# Patient Record
Sex: MALE | NOT HISPANIC OR LATINO | Employment: FULL TIME | URBAN - METROPOLITAN AREA
[De-identification: names, ages, dates, MRNs, and addresses within clinical notes are randomized per-mention and may not be internally consistent; named-entity substitution may affect disease eponyms.]

---

## 2017-09-22 ENCOUNTER — THERAPY VISIT (OUTPATIENT)
Dept: PHYSICAL THERAPY | Facility: CLINIC | Age: 37
End: 2017-09-22
Payer: COMMERCIAL

## 2017-09-22 DIAGNOSIS — M25.561 RIGHT KNEE PAIN: Primary | ICD-10-CM

## 2017-09-22 DIAGNOSIS — M25.551 HIP PAIN, RIGHT: ICD-10-CM

## 2017-09-22 PROCEDURE — 97140 MANUAL THERAPY 1/> REGIONS: CPT | Mod: GP | Performed by: PHYSICAL THERAPIST

## 2017-09-22 PROCEDURE — 97161 PT EVAL LOW COMPLEX 20 MIN: CPT | Mod: GP | Performed by: PHYSICAL THERAPIST

## 2017-09-22 PROCEDURE — 97110 THERAPEUTIC EXERCISES: CPT | Mod: GP | Performed by: PHYSICAL THERAPIST

## 2017-09-22 ASSESSMENT — ACTIVITIES OF DAILY LIVING (ADL)
GO UP STAIRS: ACTIVITY IS NOT DIFFICULT
PAIN: THE SYMPTOM AFFECTS MY ACTIVITY SLIGHTLY
STAND: ACTIVITY IS NOT DIFFICULT
STIFFNESS: I DO NOT HAVE THE SYMPTOM
SQUAT: ACTIVITY IS SOMEWHAT DIFFICULT
WEAKNESS: I DO NOT HAVE THE SYMPTOM
GO DOWN STAIRS: ACTIVITY IS MINIMALLY DIFFICULT
HOW_WOULD_YOU_RATE_THE_OVERALL_FUNCTION_OF_YOUR_KNEE_DURING_YOUR_USUAL_DAILY_ACTIVITIES?: NOT ANSWERED
RAW_SCORE: 59.23
WALK: ACTIVITY IS NOT DIFFICULT
KNEE_ACTIVITY_OF_DAILY_LIVING_SCORE: 84.61
SIT WITH YOUR KNEE BENT: NOT ANSWERED
GIVING WAY, BUCKLING OR SHIFTING OF KNEE: I DO NOT HAVE THE SYMPTOM
RISE FROM A CHAIR: ACTIVITY IS MINIMALLY DIFFICULT
AS_A_RESULT_OF_YOUR_KNEE_INJURY,_HOW_WOULD_YOU_RATE_YOUR_CURRENT_LEVEL_OF_DAILY_ACTIVITY?: NOT ANSWERED
KNEEL ON THE FRONT OF YOUR KNEE: ACTIVITY IS SOMEWHAT DIFFICULT
KNEE_ACTIVITY_OF_DAILY_LIVING_SUM: 55
LIMPING: I HAVE THE SYMPTOM BUT IT DOES NOT AFFECT MY ACTIVITY
SWELLING: I DO NOT HAVE THE SYMPTOM

## 2017-09-22 NOTE — LETTER
[facility]TH[facility] PHYSICAL THERAPY  [address]  [place] [address]  [phone]    2017    Re: Garfield Kuhn   :   1980  MRN:  [id number]   REFERRING PHYSICIAN:   Wendi Cooley MD    [facility] PHYSICAL [facility]  Date of Initial Evaluation:  2017  Visits: 1  Rxs Used: 1  Reason for Referral:     Right knee pain  Hip pain, right  EVALUATION SUMMARY  Physical Therapy Initial Evaluation  2017   Precautions/Restrictions/MD instructions: PT eval and treat. R patellar tendonitis, patellar and medial femoral condyle chondromalacia.    Subjective:   Date of Onset: 4 weeks ago  C/C: R anterior knee pain along patellar tendon and R hip anterior pain.   Quality of pain is sharp. Pains are described as intermittent in nature. Pain is worse: with activity. Pain is rated 4/10.   History of symptoms: Pains began gradually as the result of running. Since onset, symptoms are same.  Worsened by: Running,stairs, kneeling. Also noticed he has been flaring R knee out when cycling. Some pain in knee at top of stroke.   Alleviated by: Rest.    General health as reported by patient: good  Pertinent medical/surgical history: Elbow surgery 2x. Imaging: x-ray. Current occupational status: Management. Patient's goals are: decrease pain, running/cycling without pain. Return to MD:  PRN.   Current exercise regime: 100 miles biking per week. Runs 3-4x wk about 4 miles  Therapist Impression:   Garfield Kuhn is a 37 year old male with 4 week history of right knee and hip pain. He presents with signs and symptoms consistent with patellar tendonitis, patellar and medial femoral condyle chondromalacia. These impairments limit his ability to run without pain. Skilled PT services are necessary in order to reduce impairments and improve independent function.  Objective:  Re: Garfield Kuhn   :   1980   KNEE EXAMINATION  Dynamic  Movement Screen:  DL Squat: Good technique/no significant findings  SL Squat: Anterior knee translation, Knee valgus, Hip internal rotation and Improper use of glutes/hips  Gait: No significant findings  Step: Not assessed    Knee PROM L  Pain? R Pain?   Hyperextension 5 - 8 -   Flexion 124 - 126 -     Stability Tests Instability    L Pain? R Pain?   Varus 0 - - - -   Varus 30 - - - -   Valgus 0 - - - -   Valgus 30 - - - -   Anterior Drawer - - - -   Posterior Drawer - - - -   Lachman - - - -     Resisted Tests Strength (MMT)    L Pain? R Pain?   Knee Ext       Knee Flx       Hip ABD 5 - 5 -   Hip EXT 5 - 5 -   Other:  Joint Line Swelling: None  Passive Patellar Mobility: Normal  Meniscal: n/a  Assessment/Plan:    The patient is a 37 year old male with chief complaint of R hip and knee pain.    The patient has the following significant findings with corresponding treatment plan.  Diagnosis 1:  R knee pain, patellar tendonitis, PF chondromalacia    Pain -  hot/cold therapy, US, electric stimulation, manual therapy, splint/taping/bracing/orthotics and self management  Decreased ROM/flexibility - manual therapy, therapeutic exercise and home program  Decreased joint mobility - manual therapy and therapeutic exercise  Decreased strength - therapeutic exercise, therapeutic activities and home program  Impaired muscle performance - neuro re-education  Decreased function - therapeutic activities  Diagnosis 2:  R hip impingement     Pain -  hot/cold therapy, US, electric stimulation, manual therapy, splint/taping/bracing/orthotics and self management  Decreased ROM/flexibility - manual therapy, therapeutic exercise and home program  Decreased joint mobility - manual therapy and therapeutic exercise  Decreased strength - therapeutic exercise, therapeutic activities and home program  Impaired muscle performance - neuro re-education  Decreased function - therapeutic activities  Impaired posture - neuro re-education  Therapy  Evaluation Codes:   1) History comprised of:   Personal factors that impact the plan of care:      None.    Comorbidity factors that impact the plan of care are:      None.     Medications impacting care: None.  2) Examination of Body Systems comprised of:   Body structures and functions that impact the plan of care:      Hip and Knee.   Activity limitations that impact the plan of care are:      Bending, Sitting and Squatting/kneeling.   Clinical presentation characteristics are:    Stable/Uncomplicated.  3) Presentation comprised of:   Presentation scored as Low complexity with uncomplicated characteristics..  4) Decision-Making    Low complexity using standardized patient assessment instrument and/or measureable assessment of functional outcome.  Cumulative Therapy Evaluation is: Low complexity.  Previous and current functional limitations:  (See Goal Flow Sheet for this information)    Short term and Long term goals: (See Goal Flow Sheet for this information)   Communication ability:  Patient appears to be able to clearly communicate and understand verbal and written communication and follow directions correctly.  Treatment Explanation - The following has been discussed with the patient: RX ordered/plan of care, anticipated outcomes, and possible risks and side effects.  This patient would benefit from PT intervention to resume normal activities.   Rehab potential is good.  Frequency:  2 X a month, once daily  Duration:  for 3 months  Discharge Plan: Achieve all LTGs, be independent in home treatment program, and reach maximal therapeutic benefit.                           Thank you for your referral.    INQUIRIES  Therapist: Elliot Thakur DPT   [facility] PHYSICAL [facility]ERAP[facility]  [address] [place], [place] [address] [address]  Phone: [phone]  Fax: [phone]

## 2017-09-22 NOTE — PROGRESS NOTES
Physical Therapy Initial Evaluation  September 22, 2017     Precautions/Restrictions/MD instructions: PT eval and treat. R patellar tendonitis, patellar and medial femoral condyle chondromalacia.    Subjective:   Date of Onset: 4 weeks ago  C/C: R anterior knee pain along patellar tendon and R hip anterior pain.   Quality of pain is sharp. Pains are described as intermittent in nature. Pain is worse: with activity. Pain is rated 4/10.   History of symptoms: Pains began gradually as the result of running. Since onset, symptoms are same.  Worsened by: Running,stairs, kneeling. Also noticed he has been flaring R knee out when cycling. Some pain in knee at top of stroke.   Alleviated by: Rest.    General health as reported by patient: good  Pertinent medical/surgical history: Elbow surgery 2x. Imaging: x-ray. Current occupational status: Management. Patient's goals are: decrease pain, running/cycling without pain. Return to MD:  PRN.   Current exercise regime: 100 miles biking per week. Runs 3-4x wk about 4 miles    Therapist Impression:   Garfield Kuhn is a 37 year old male with 4 week history of right knee and hip pain. He presents with signs and symptoms consistent with patellar tendonitis, patellar and medial femoral condyle chondromalacia. These impairments limit his ability to run without pain. Skilled PT services are necessary in order to reduce impairments and improve independent function.    Objective:   KNEE EXAMINATION    Dynamic Movement Screen:  DL Squat: Good technique/no significant findings  SL Squat: Anterior knee translation, Knee valgus, Hip internal rotation and Improper use of glutes/hips  Gait: No significant findings  Step: Not assessed    Knee PROM L  Pain? R Pain?   Hyperextension 5 - 8 -   Flexion 124 - 126 -     Stability Tests Instability    L Pain? R Pain?   Varus 0 - - - -   Varus 30 - - - -   Valgus 0 - - - -   Valgus 30 - - - -   Anterior Drawer - - - -   Posterior Drawer - - - -    Lachman - - - -     Resisted Tests Strength (MMT)    L Pain? R Pain?   Knee Ext       Knee Flx       Hip ABD 5 - 5 -   Hip EXT 5 - 5 -     Other:  Joint Line Swelling: None  Passive Patellar Mobility: Normal  Meniscal: n/a    Assessment/Plan:    The patient is a 37 year old male with chief complaint of R hip and knee pain.    The patient has the following significant findings with corresponding treatment plan.  Diagnosis 1:  R knee pain, patellar tendonitis, PF chondromalacia    Pain -  hot/cold therapy, US, electric stimulation, manual therapy, splint/taping/bracing/orthotics and self management  Decreased ROM/flexibility - manual therapy, therapeutic exercise and home program  Decreased joint mobility - manual therapy and therapeutic exercise  Decreased strength - therapeutic exercise, therapeutic activities and home program  Impaired muscle performance - neuro re-education  Decreased function - therapeutic activities    Diagnosis 2:  R hip impingement     Pain -  hot/cold therapy, US, electric stimulation, manual therapy, splint/taping/bracing/orthotics and self management  Decreased ROM/flexibility - manual therapy, therapeutic exercise and home program  Decreased joint mobility - manual therapy and therapeutic exercise  Decreased strength - therapeutic exercise, therapeutic activities and home program  Impaired muscle performance - neuro re-education  Decreased function - therapeutic activities  Impaired posture - neuro re-education      Therapy Evaluation Codes:   1) History comprised of:   Personal factors that impact the plan of care:      None.    Comorbidity factors that impact the plan of care are:      None.     Medications impacting care: None.  2) Examination of Body Systems comprised of:   Body structures and functions that impact the plan of care:      Hip and Knee.   Activity limitations that impact the plan of care are:      Bending, Sitting and Squatting/kneeling.   Clinical presentation  characteristics are:    Stable/Uncomplicated.  3) Presentation comprised of:   Presentation scored as Low complexity with uncomplicated characteristics..  4) Decision-Making    Low complexity using standardized patient assessment instrument and/or measureable assessment of functional outcome.  Cumulative Therapy Evaluation is: Low complexity.    Previous and current functional limitations:  (See Goal Flow Sheet for this information)    Short term and Long term goals: (See Goal Flow Sheet for this information)     Communication ability:  Patient appears to be able to clearly communicate and understand verbal and written communication and follow directions correctly.  Treatment Explanation - The following has been discussed with the patient: RX ordered/plan of care, anticipated outcomes, and possible risks and side effects.  This patient would benefit from PT intervention to resume normal activities.   Rehab potential is good.    Frequency:  2 X a month, once daily  Duration:  for 3 months  Discharge Plan: Achieve all LTGs, be independent in home treatment program, and reach maximal therapeutic benefit.    Please refer to the daily flowsheet for treatment today, total treatment time and time spent performing 1:1 timed codes.

## 2017-09-22 NOTE — PROGRESS NOTES
Subjective:    Patient is a 37 year old male presenting with rehab left ankle/foot hpi.                                      Pertinent medical history includes:  None.  Medical allergies: no.  Other surgeries include:  None reported.  Current medications:  Anti-depressants.  Current occupation is Management .  Patient is working in normal job without restrictions.      Barriers include:  None as reported by patient.    Red flags:  None as reported by patient.           Knee Activity of Daily Living Score: 84.61            Objective:    System    Physical Exam    General     ROS    Assessment/Plan:

## 2017-10-03 ENCOUNTER — THERAPY VISIT (OUTPATIENT)
Dept: PHYSICAL THERAPY | Facility: CLINIC | Age: 37
End: 2017-10-03
Payer: COMMERCIAL

## 2017-10-03 DIAGNOSIS — M25.551 HIP PAIN, RIGHT: ICD-10-CM

## 2017-10-03 DIAGNOSIS — M25.561 RIGHT KNEE PAIN: ICD-10-CM

## 2017-10-03 PROCEDURE — 97140 MANUAL THERAPY 1/> REGIONS: CPT | Mod: GP | Performed by: PHYSICAL THERAPIST

## 2017-10-03 PROCEDURE — 97112 NEUROMUSCULAR REEDUCATION: CPT | Mod: GP | Performed by: PHYSICAL THERAPIST

## 2017-10-03 PROCEDURE — 97110 THERAPEUTIC EXERCISES: CPT | Mod: GP | Performed by: PHYSICAL THERAPIST

## 2017-10-17 ENCOUNTER — THERAPY VISIT (OUTPATIENT)
Dept: PHYSICAL THERAPY | Facility: CLINIC | Age: 37
End: 2017-10-17
Payer: COMMERCIAL

## 2017-10-17 DIAGNOSIS — M25.551 HIP PAIN, RIGHT: ICD-10-CM

## 2017-10-17 DIAGNOSIS — M25.561 RIGHT KNEE PAIN: ICD-10-CM

## 2017-10-17 PROCEDURE — 97140 MANUAL THERAPY 1/> REGIONS: CPT | Mod: GP | Performed by: PHYSICAL THERAPIST

## 2017-10-17 PROCEDURE — 97112 NEUROMUSCULAR REEDUCATION: CPT | Mod: GP | Performed by: PHYSICAL THERAPIST

## 2017-10-31 ENCOUNTER — THERAPY VISIT (OUTPATIENT)
Dept: PHYSICAL THERAPY | Facility: CLINIC | Age: 37
End: 2017-10-31
Payer: COMMERCIAL

## 2017-10-31 DIAGNOSIS — M25.561 RIGHT KNEE PAIN: ICD-10-CM

## 2017-10-31 DIAGNOSIS — M25.551 HIP PAIN, RIGHT: ICD-10-CM

## 2017-10-31 PROCEDURE — 97112 NEUROMUSCULAR REEDUCATION: CPT | Mod: GP | Performed by: PHYSICAL THERAPIST

## 2017-10-31 PROCEDURE — 97110 THERAPEUTIC EXERCISES: CPT | Mod: GP | Performed by: PHYSICAL THERAPIST

## 2017-10-31 NOTE — PROGRESS NOTES
Running Analysis:  Rear View:  Increased supination throughout gait cycle on R compared to L.   Mildly decreased trunk rotation on R and holds arm slightly closer to body.   Increased vertical displacement.  R side trendelenburg.  Increased scissoring on R vs. L.    Side View:  Near full knee extension and increased forward foot strike on R compared to L.   Slightly lacking hip extension bilaterally.     Front View:  Increased supination throughout gait cycle on R compared to L.   Increased scissoring on R vs. L.  No increase in IR of femur during landing/loading on R (WNL).    Recommendations:  Nelly manipulation to 170-172 steps per minute  Hip hiking to correct R glute med functional deficit.   Single leg hop, stick, and hold for functional R side kinetic chain motor control.

## 2017-11-28 ENCOUNTER — THERAPY VISIT (OUTPATIENT)
Dept: PHYSICAL THERAPY | Facility: CLINIC | Age: 37
End: 2017-11-28
Payer: COMMERCIAL

## 2017-11-28 DIAGNOSIS — M25.561 RIGHT KNEE PAIN: ICD-10-CM

## 2017-11-28 DIAGNOSIS — M25.551 HIP PAIN, RIGHT: ICD-10-CM

## 2017-11-28 PROCEDURE — 97110 THERAPEUTIC EXERCISES: CPT | Mod: GP | Performed by: PHYSICAL THERAPIST

## 2017-11-28 PROCEDURE — 97112 NEUROMUSCULAR REEDUCATION: CPT | Mod: GP | Performed by: PHYSICAL THERAPIST

## 2018-01-02 ENCOUNTER — THERAPY VISIT (OUTPATIENT)
Dept: PHYSICAL THERAPY | Facility: CLINIC | Age: 38
End: 2018-01-02
Payer: COMMERCIAL

## 2018-01-02 DIAGNOSIS — M25.551 HIP PAIN, RIGHT: ICD-10-CM

## 2018-01-02 DIAGNOSIS — M25.561 RIGHT KNEE PAIN: ICD-10-CM

## 2018-01-02 PROCEDURE — 97530 THERAPEUTIC ACTIVITIES: CPT | Mod: GP | Performed by: PHYSICAL THERAPIST

## 2018-01-02 PROCEDURE — 97110 THERAPEUTIC EXERCISES: CPT | Mod: GP | Performed by: PHYSICAL THERAPIST

## 2018-04-05 PROBLEM — M25.551 HIP PAIN, RIGHT: Status: RESOLVED | Noted: 2017-09-22 | Resolved: 2018-04-05

## 2018-04-05 PROBLEM — M25.561 RIGHT KNEE PAIN: Status: RESOLVED | Noted: 2017-09-22 | Resolved: 2018-04-05

## 2018-04-05 NOTE — PROGRESS NOTES
Discharge Note    Progress reporting period is from initial eval to Jan 2, 2018.     Garfield failed to return for next follow up visit and current status is unknown.  Please see information below for last relevant information on current status.  Patient seen for Rxs Used: 6 visits.Decreased ROM/flexibility - HEP  Decreased function - HEP  Decreased strength - HEPAssessment of Progress: current status is unknown.  Last current status: Assessment of progress: Pt has experienced exacerbation of symptoms (hip).  Self Management Plans:  HEP    I have re-evaluated this patient and find that the nature, scope, duration and intensity of the therapy is appropriate for the medical condition of the patient.  Garfield continues to require the following intervention to meet STG and LTG's:  HEP.    Recommendations:  Discharge with current home program.  Patient to follow up with MD as needed. Episode to be closed at this time and patient formally discharged from therapy.    Elliot Thakur, PT, DPT, OCS      Please refer to the daily flowsheet for treatment today, total treatment time and time spent performing 1:1 timed codes.

## 2018-09-25 ENCOUNTER — OFFICE VISIT (OUTPATIENT)
Dept: OTHER | Facility: OUTPATIENT CENTER | Age: 38
End: 2018-09-25
Payer: COMMERCIAL

## 2018-09-25 DIAGNOSIS — F32.5 MAJOR DEPRESSION IN COMPLETE REMISSION (H): Primary | ICD-10-CM

## 2018-09-25 DIAGNOSIS — F43.21 ADJUSTMENT DISORDER WITH DEPRESSED MOOD: ICD-10-CM

## 2018-09-25 ASSESSMENT — ANXIETY QUESTIONNAIRES
2. NOT BEING ABLE TO STOP OR CONTROL WORRYING: NOT AT ALL
6. BECOMING EASILY ANNOYED OR IRRITABLE: NOT AT ALL
1. FEELING NERVOUS, ANXIOUS, OR ON EDGE: NOT AT ALL
5. BEING SO RESTLESS THAT IT IS HARD TO SIT STILL: NOT AT ALL
7. FEELING AFRAID AS IF SOMETHING AWFUL MIGHT HAPPEN: NOT AT ALL
GAD7 TOTAL SCORE: 0
3. WORRYING TOO MUCH ABOUT DIFFERENT THINGS: NOT AT ALL

## 2018-09-25 ASSESSMENT — PATIENT HEALTH QUESTIONNAIRE - PHQ9: 5. POOR APPETITE OR OVEREATING: NOT AT ALL

## 2018-09-25 NOTE — MR AVS SNAPSHOT
After Visit Summary   2018    Garfield Kuhn    MRN: [id number]           Patient Information     Date Of Birth          1980        Visit Information        Provider Department      2018 8:30 AM Kin Panchal [facility]        Today's Diagnoses     Major depression in complete remission (H)    -  1    Adjustment disorder with depressed mood           Follow-ups after your visit        Who to contact     Please call your clinic at [phone] to:    Ask questions about your health    Make or cancel appointments    Discuss your medicines    Learn about your test results    Speak to your doctor            Additional Information About Your Visit        MyChart Information     [employer] is an electronic gateway that provides easy, online access to your medical records. With [employer], you can request a clinic appointment, read your test results, renew a prescription or communicate with your care team.     To sign up for [employer] visit the website at [email or web address]   You will be asked to enter the access code listed below, as well as some personal information. Please follow the directions to create your username and password.     Your access code is: [email or web address]  Expires: 2018  5:56 PM     Your access code will  in 90 days. If you need help or a new code, please contact your [facility] or call [phone] for assistance.        Care EveryWhere ID     This is your Care EveryWhere ID. This could be used by other organizations to access your [place] medical records  [id number]         Blood Pressure from Last 3 Encounters:   05 110/80   10/11/05 122/70    Weight from Last 3 Encounters:   10/11/05 118.9 kg (262 lb 3.2 oz)              We Performed the Following     Diagnostic Assessment (complete) [34321]        Primary Care Provider Office Phone # Fax #    Waqar Stokes [phone]        NO INFO FOUND  [facility] [address]        Equal Access to Services     TRINOSANA BRENDA : Hadii aad chucho cris Thomas, wadeonda luqgee, qabobta kaerick racielwildstar, waxsalud dante aleenaraul blankenship shaniquebasia pastrana. So [facility] [phone].    ATENCIÓN: Si habla español, tiene a mensah disposición servicios gratuitos de asistencia lingüística. Llame al [phone].    We comply with applicable federal civil rights laws and [place] laws. We do not discriminate on the basis of race, color, national origin, age, disability, sex, sexual orientation, or gender identity.            Thank you!     Thank you for choosing [facility]  for your care. Our goal is always to provide you with excellent care. Hearing back from our patients is one way we can continue to improve our services. Please take a few minutes to complete the written survey that you may receive in the mail after your visit with us. Thank you!             Your Updated Medication List - Protect others around you: Learn how to safely use, store and throw away your medicines at www.disposemymeds.org.          This list is accurate as of 9/25/18  5:56 PM.  Always use your most recent med list.                   Brand Name Dispense Instructions for use Diagnosis    EFFEXOR  MG 24 hr capsule   Generic drug:  venlafaxine      1 CAPSULE DAILY WITH FOOD        FLONASE 50 MCG/DOSE nasal spray     1    2 SPRAYS IN EACH NOSTRIL QD (Once per day)    Depressive disorder, not elsewhere classified       LEXAPRO 20 MG tablet   Generic drug:  escitalopram      1 TABLET DAILY        LORazepam 0.5 MG tablet    ATIVAN     1 TABLET TWICE DAILY

## 2018-09-25 NOTE — PROGRESS NOTES
"[facility]  Program in Human Sexuality  Department of Family Medicine & Community Health  [facility]   [address]               [place] [address]  Phone: [phone]  Fax: [phone]  [email or web address]    Relationship and Sex Therapy (REST) Diagnostic Assessment Interview      Date of Service: 9/25/18  Client Name: Garfield Kuhn  YOB: 1980  38 year old  MRN:  [id number]  Gender/Gender Identity: Male  Treating Provider: Kin Panchal, PhD.  Program: REST  Type of Session: Assessment  Present in Session: MH, client; NN, therapist.  Number of Minutes:  60      Ethnicity:      Any relevant cultural/Anglican issues? Client identifies as Jainism. He states that he attends services a \"handful\" of times a year.      Referral Source     Chief Complaint/Presenting Problem and Goals:  Relationship conflict.    Relationship and Sexual Therapy (REST) Program-Specific Information   1. Sexual behaviors/Functioning    Sexual Frequency (current, past history, ideal)   Client states that prior to the affair, he was having sex with his wife once a week. Client states while he was having the affair, he would have sex once every two weeks with his wife. Client states that he is not currently having sex with his wife. Client reports that he would typically initiate sex. Client states that ideally he would like to have sex with his wife once or twice a week.       Sexual Desire/Interest/Arousal  Client reports no issues with sexual desire/interest/arousal.       Erections, Orgasm, & Ejaculation   Client reports no issues with erections, orgasm or ejaculations     Pain During Sex  Client reports no pain during sex.     Sexual aversion/avoidance  Client reports no sexual behaviors he is averse to or avoidant of.       2. Relationship History   Client reports that he has been with his partner for 17 years;  for 12 years. Client " "reports that they met in grade school and high school. They began dating after college. Client states that they dated four to five years before they were engaged. Client states that they were engaged for a year to a year and a half before they got .      Client reports that he had an affair with a partner he dated in high school (Cesia) and in college. The relationship ended because of school; she was the one who ended it. Client states that the ending to their relationship was \"awkward\" and was one of the reasons she wanted to reach back out. Client states that when the affair was going, he would have contact with Cesia once a week. States they would see each other 3-4 times a week and typically have sex once a week.    Initially, client communicated that he wanted to be friends, but ended up having an emotional and physical relationship with her. Client reported that the affair lasted from August 2017 through June 2018. Client states he tried to end the relationship in January, 2018. In June of this year, her  was suspicious that Cesia was cheating. At the beginning of September, 2018, Cesia's  contacted his wife. Client reports he has had no contact with Cesia since the beginning of the month.     Client states that he was missing passion and attractiveness to his wife. Client states he was not looking for an affair; states that this is a  out.    Client states that since the affair, he still lives with his wife. He reports that they sleep in the same bed, say \"I love you\" to each other. Client states that what keeps him leaning into the relationship are his children, spending time with his wife, shared interests, and talking together.     First sexual experience with partner:  Client states that he first had sex at age 17.     Unpleasant or traumatic sexual experiences   Client states that he has never had a traumatic or unpleasant sexual experiences.     Masturbation   Client states that he " masturbates once a week. Client uses pornography to masturbate to. Client states that his masturbation and pornography have not caused conflict with his wife.       Same sex experiences and fantasies  Client denies issues with sexual orientation or gender identity.     3. Couple Relationship Assessment   1 =  Totally Dissatisfied     10 = Totally Satisfied                 Overall happiness/Satisfaction:   8   Personality of partner:  8   Communication/Conflict resolution:  6   Financial management:  9   Sexual relationship:  4   Children/parentin   Family and friends:  4   Household roles/responsibilities:  8   Other:       4. Compulsive Sexuality:   Client denies any issues with compulsive sexual behaviors.     Mental Health History:   Client went through a severe bout of depression last spring. He was hospitalized for a weekend and was hospitalized again, this time for four days. States he received ECT for his depression. Client states that he has experienced depression since . Client has been taking medication and seeing a therapist. Client states that he did not realize the extent that his depression has had on his marriage. He states his wife has walked on egg shells. Client states he has never attempted to take his life. Client states that work is a trigger for his depression. He states that his suicidal spirals are triggered by disappointments at his job.    Client is currently in DEBT program at [facility] in [place], [place]. Client states he has been participating in DEBT for a year. Client states he is unsure if he finds DEBT helpful but that his depression/anxiety feels more under control. Client states that his wife has attended some of his therapy appointments. The focus of therapy was on his depression and not couple specific.     Substance Use:  Client denies substance abuse issues. Client states that he will have 2-6 drinks a week.       Medical History:  Client states that he has had  "jaw and elbow surgeries in the past.     Client states that his wife has had her tubes tied.    Relationships/Social History    Family History: [where grew up, moves, parent divorce]    Client states he was born in [place], [place] and raised by Derik (74) and Liz (73). Client's father was a doctor and his mother was a nurse. Client states that he is close with his mother and is distant with his father. Client states that his relationship with his father has always been \"awkward.\" Client has two sisters, Colleen (41) and Nicolle (47). Client states that he gets along with his sisters. Client states that his mother is on antidepressants; client states that his family does not have a history of substance abuse/dependence. Client states that he told his family about the affair. He states that his family members are not happy with him and are upset. Client states that he felt compelled to tell them. He reports that he wanted to be honest with them and wanted his wife to have someone else to talk to about the affair. Client states that his wife is close to his family. Client states that his in laws do not know about the affair. He states that he does not have a relationship with them.He reports that he does not know why his in laws do not like them; feels ignored during family events.    Client has two children with his wife: Joselyn (4); Tarun (1).     Client denies any physical/sexual abuse.    Educational History   Client states that he has a BA in E[employer].     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 Client states that he likes his job currently; is unsure about his future career goals.     Legal Issues  Denies any legal issues.      _________________________________________________________________________     CONCLUSIONS    Strengths and Liabilities: social support; enjoys spending time with his partner; recent affair    Symptoms: Client is currently " struggling with depressive symptoms. These symptoms include low mood, excessive shame/guilt, and suicidal ideation. These symptoms have interfered with his relationship with his wife. Additionally, client is struggling with the recent disclosure that he had been having an extramarital affair. This has caused relationship stress with his wife and has increased his feelings of guilt/shame.      Mental Status:   Appearance:  Casually dressed  Behavior/relationship to examiner/demeanor:  Cooperative  Orientation: Oriented to person, place, time and situation  Speech Rate:  Normal  Speech Spontaneity:  Normal  Mood:  depressed, sad and remorseful  Affect:  Appropriate/mood-congruent  Thought Process (Associations):  Logical  Thought Content:  no evidence of suicidal or homicidal ideation and patient denies auditory and visual hallucinations  Abnormal Perception:  None  Attention/Concentration:  Fair  Language:  Intact  Insight:  Fair  Judgment:  Poor      DSM-5 Diagnosis:  296.26 (F32.5)           Major Depressive Disorder, In full remission, Single Episode  309.0 (F43.21)   Adjustment Disorder, With depressed mood  V61.10 (Z63.0)           Relationship Distress with Spouse or Intimate Partner    Conclusions/Recommendations/Initial Treatment Goals:     The client is a 38 year old American person assigned male at birth who identifies as a male. Based on the client's current report of symptoms, client continues to meet criteria for MDD, In full remission, single episode and Adjustment Disorder with depressed mood. The client's mental health concerns affect client's ability to function in his relationship as he reports relationship distress and depression associated with his relationship problems. The client reports no significant changes in his alcohol use frequency and duration. Client denies safety concerns. Based on the client's reported symptoms and impact on functioning, the plan for the patient is:  1. Start  supportive family therapy with a provider specialized in couples relationships. Therapy should focus on helping client process his affair and reconnect with his partner.  2. Continue care with a psychiatrist for medication management.   3. Continue care with client's therapist; continue care with client's DEBT group.  4. Continue to assess the impact of client's family and relational patterns on their current well-being.       Kin Panchal, ., LMFT

## 2018-09-25 NOTE — PROGRESS NOTES
I did not personally see the patient. I reviewed and agree with the assessment and plan of this note.     Charlotte Salas, PhD, LMFT

## 2018-09-27 ASSESSMENT — ANXIETY QUESTIONNAIRES: GAD7 TOTAL SCORE: 0

## 2018-09-27 ASSESSMENT — PATIENT HEALTH QUESTIONNAIRE - PHQ9: SUM OF ALL RESPONSES TO PHQ QUESTIONS 1-9: 3

## 2018-11-05 ENCOUNTER — OFFICE VISIT (OUTPATIENT)
Dept: OTHER | Facility: OUTPATIENT CENTER | Age: 38
End: 2018-11-05
Payer: COMMERCIAL

## 2018-11-05 DIAGNOSIS — F32.5 MAJOR DEPRESSION IN COMPLETE REMISSION (H): ICD-10-CM

## 2018-11-05 DIAGNOSIS — F43.21 ADJUSTMENT DISORDER WITH DEPRESSED MOOD: Primary | ICD-10-CM

## 2018-11-05 NOTE — MR AVS SNAPSHOT
After Visit Summary   11/5/2018    Garfield Kuhn    MRN: [id number]           Patient Information     Date Of Birth          1980        Visit Information        Provider Department      11/5/2018 8:30 AM Kin Panchal FT [facility] Sexual Health        Today's Diagnoses     Adjustment disorder with depressed mood    -  1    Major depression in complete remission (H)           Follow-ups after your visit        Your next 10 appointments already scheduled     Nov 19, 2018  9:30 AM CST   Family Therapy with Kin Panchal FT   [facility] ([facility])    [address]  [address]  [facility] [address]   [phone]            Dec 03, 2018  8:30 AM CST   Family Therapy with Kin Panchal FT   [facility] ([facility])    [address]  [address]  [facility] [address]   [phone]            Dec 17, 2018  8:30 AM CST   Family Therapy with Kin Panchla FT   [facility] ([facility])    [address]  [address]  [facility] [address]   [phone]              Who to contact     Please call your clinic at [phone] to:    Ask questions about your health    Make or cancel appointments    Discuss your medicines    Learn about your test results    Speak to your doctor            Additional Information About Your Visit        MyChart Information     [employer] is an electronic gateway that provides easy, online access to your medical records. With [employer], you can request a clinic appointment, read your test results, renew a prescription or communicate with your care team.     To sign up for [employer] visit the website at [email or web address]   You will be asked to enter the access code listed below, as well as some personal information. Please follow the directions to create your username and password.     Your access code is: [email or web address]  Expires:  2018  4:56 PM     Your access code will  in 90 days. If you need help or a new code, please contact your [facility] or call [phone] for assistance.        Care EveryWhere ID     This is your Care EveryWhere ID. This could be used by other organizations to access your [facility] medical records  [id number]         Blood Pressure from Last 3 Encounters:   05 110/80   10/11/05 122/70    Weight from Last 3 Encounters:   10/11/05 118.9 kg (262 lb 3.2 oz)              We Performed the Following     Mental Health Tx Plan Scan (HIM Scan)     Psychotherapy Family/Couple with Patient [[id number]]        Primary Care Provider Office Phone # Fax #    Waqar Stokes [phone]       NO INFO FOUND  [facility] [address]        Equal Access to Services     AYDEN GUTIERREZ : Hadii thu ku hadasho Soomaali, waaxda luqadaha, qaybta kaalmada adeegyada, brigette howell hayyarely orozco . So [facility] [phone].    ATENCIÓN: Si habla español, tiene a mensah disposición servicios gratuitos de asistencia lingüística. Llame al [phone].    We comply with applicable federal civil rights laws and [place] laws. We do not discriminate on the basis of race, color, national origin, age, disability, sex, sexual orientation, or gender identity.            Thank you!     Thank you for choosing [facility]  for your care. Our goal is always to provide you with excellent care. Hearing back from our patients is one way we can continue to improve our services. Please take a few minutes to complete the written survey that you may receive in the mail after your visit with us. Thank you!             Your Updated Medication List - Protect others around you: Learn how to safely use, store and throw away your medicines at www.disposemymeds.org.          This list is accurate as of 18 11:59 PM.  Always use your most recent med list.                   Brand  Name Dispense Instructions for use Diagnosis    EFFEXOR  MG 24 hr capsule   Generic drug:  venlafaxine      1 CAPSULE DAILY WITH FOOD        FLONASE 50 MCG/DOSE nasal spray     1    2 SPRAYS IN EACH NOSTRIL QD (Once per day)    Depressive disorder, not elsewhere classified       LEXAPRO 20 MG tablet   Generic drug:  escitalopram      1 TABLET DAILY        LORazepam 0.5 MG tablet    ATIVAN     1 TABLET TWICE DAILY

## 2018-11-05 NOTE — PROGRESS NOTES
"[facility] -  Case Progress Note    Date of Service: 18   Name: Garfield Kuhn  : 1980  Medical Record Number: [id number]  Treating Provider: Kin Panchal, PhD., [facility]  Type of Session: Couple  Present in Session: MH, client; LH, client's spouse; NN, therapist.  Number of Minutes:  60    Current Symptoms/Status:  Client is currently struggling with depressive symptoms. These symptoms include low mood, excessive shame/guilt, and suicidal ideation. These symptoms have interfered with his relationship with his wife. Additionally, client is struggling with the recent disclosure that he had been having an extramarital affair. This has caused relationship stress with his wife and has increased his feelings of guilt/shame.    Progress Toward Treatment Goals:   Reviewed and signed treatment plans. Client stated that since his last sessions, he feels that the relationship is going \"ok.\" Client's wife stated that they have had conversations related to intimacy; feels these have been productive. She stated that she feels nervous about beginning couples therapy; concerned about her 's perfectionism. She stated that she does not want to check on his social media but stated that if he has any contact with the other woman again, the relationship is over. She also stated that she fears her  may have an affair if he continues to reduce his depression level. Both identified barriers to physical intimacy (client: lack of affirmations; spouse: lack of emotional connection).    Intervention: Modality and Description:  Therapist used didactic strategies to educate client and wife on how depression affects relationships; also used insight to identify blocks to intimacy.    Response to Intervention:  Client and his wife appeared receptive to this writer's techniques; were able to identify the role that untreated mental health has had on their relationship.    Assignment:  Will read [facility] " Marriage ([facility]).    DSM-5 Diagnoses:  309.0 (F43.21)           Adjustment Disorder, With depressed mood  296.26 (F32.5)           Major Depressive Disorder, In full remission, Single Episode    V61.10 (Z63.0)           Relationship Distress with Spouse or Intimate Partner    Plan/Need for Future Services:  Return for therapy in two weeks to treat diagnosed problems.        Kin Panchal, PhD., LMFT, Postdoctoral fellow

## 2018-11-19 ENCOUNTER — OFFICE VISIT (OUTPATIENT)
Dept: OTHER | Facility: OUTPATIENT CENTER | Age: 38
End: 2018-11-19
Payer: COMMERCIAL

## 2018-11-19 DIAGNOSIS — F43.21 ADJUSTMENT DISORDER WITH DEPRESSED MOOD: ICD-10-CM

## 2018-11-19 DIAGNOSIS — F32.5 MAJOR DEPRESSION IN COMPLETE REMISSION (H): ICD-10-CM

## 2018-11-19 NOTE — MR AVS SNAPSHOT
After Visit Summary   2018    Garfield Kuhn    MRN: [id number]           Patient Information     Date Of Birth          1980        Visit Information        Provider Department      2018 9:30 AM Kin Panchal FT [facility] Sexual Health        Today's Diagnoses     Major depression in complete remission (H)        Adjustment disorder with depressed mood           Follow-ups after your visit        Your next 10 appointments already scheduled     Dec 03, 2018  8:30 AM CST   Family Therapy with Kin Panchal FT   [facility] ([facility])    [address]  [address]  [facility] [address]   [phone]            Dec 17, 2018  8:30 AM CST   Family Therapy with REBECA Corrales   [facility] ([facility])    [address]  [address]  [facility] [address]   [phone]              Who to contact     Please call your clinic at [phone] to:    Ask questions about your health    Make or cancel appointments    Discuss your medicines    Learn about your test results    Speak to your doctor            Additional Information About Your Visit        [employer] Information     [employer] is an electronic gateway that provides easy, online access to your medical records. With [employer], you can request a clinic appointment, read your test results, renew a prescription or communicate with your care team.     To sign up for [employer] visit the website at [email or web address]   You will be asked to enter the access code listed below, as well as some personal information. Please follow the directions to create your username and password.     Your access code is: [email or web address]  Expires: 2018  4:56 PM     Your access code will  in 90 days. If you need help or a new code, please contact your [facility] or call [phone] for assistance.        Care EveryWhere ID      This is your Care EveryWhere ID. This could be used by other organizations to access your [facility] medical records  [id number]         Blood Pressure from Last 3 Encounters:   11/16/05 110/80   10/11/05 122/70    Weight from Last 3 Encounters:   10/11/05 118.9 kg (262 lb 3.2 oz)              We Performed the Following     Psychotherapy Family/Couple with Patient [[id number]]        Primary Care Provider Office Phone # Fax #    Waqar Reynaldo Stokes [phone]       NO INFO FOUND  [facility] [address]        Equal Access to Services     [facility]: Hadii aad ku hadasho Soomaali, waaxda luqadaha, qaybta kaalmada adeegyada, brigette dockery adealonzo orozco . So [facility] [phone].    ATENCIÓN: Si habla español, tiene a mensah disposición servicios gratuitos de asistencia lingüística. [facility] [phone].    We comply with applicable federal civil rights laws and [place] laws. We do not discriminate on the basis of race, color, national origin, age, disability, sex, sexual orientation, or gender identity.            Thank you!     Thank you for choosing [facility]  for your care. Our goal is always to provide you with excellent care. Hearing back from our patients is one way we can continue to improve our services. Please take a few minutes to complete the written survey that you may receive in the mail after your visit with us. Thank you!             Your Updated Medication List - Protect others around you: Learn how to safely use, store and throw away your medicines at www.disposemymeds.org.          This list is accurate as of 11/19/18 11:59 PM.  Always use your most recent med list.                   Brand Name Dispense Instructions for use Diagnosis    EFFEXOR  MG 24 hr capsule   Generic drug:  venlafaxine      1 CAPSULE DAILY WITH FOOD        FLONASE 50 MCG/DOSE nasal spray     1    2 SPRAYS IN EACH NOSTRIL QD (Once per day)    Depressive disorder, not  elsewhere classified       LEXAPRO 20 MG tablet   Generic drug:  escitalopram      1 TABLET DAILY        LORazepam 0.5 MG tablet    ATIVAN     1 TABLET TWICE DAILY

## 2018-11-19 NOTE — PROGRESS NOTES
"[facility] -  Case Progress Note    Date of Service: 18   Name: Garfield Kuhn  : 1980  Medical Record Number: [id number]  Treating Provider: Kin Panchal, PhD., [facility]  Type of Session: Couple  Present in Session: MH, client; LH, client's spouse; NN, therapist.  Number of Minutes:  60    Current Symptoms/Status:  Client is currently struggling with depressive symptoms. These symptoms include low mood, excessive shame/guilt, and suicidal ideation. These symptoms have interfered with his relationship with his wife. Additionally, client is struggling with the recent disclosure that he had been having an extramarital affair. This has caused relationship stress with his wife and has increased his feelings of guilt/shame.    Progress Toward Treatment Goals:   Client and his wife stated that since they last met, they experienced some fun together; struggles related to family sickness. Client's wife stated that her  has been more stressed lately. Client's wife shared that she has not been asking about how he is doing as she is afraid that she will make things worse; states that during arguments she feels like client \"shuts her down.\" Client stated that he thinks he can improve communicating his feelings during arguments. She stated that she can do better at sharing her feelings with her partner.    Intervention: Modality and Description:  Therapist used EFCT to help client and his wife identify underlying feelings of fear and lack of affirmations that contribute to their distance in their relationship; also used didactic strategies to educate client and his wife on ways to argue more effectively.    Response to Intervention:  Client and his wife appeared receptive to this writer's intervention as they were able to share their primary emotions with one another; both acknowledged the need for more conversations involving their feelings.    Assignment:  Client and his partner will have " one fight during now and their next session.    DSM-5 Diagnoses:  309.0 (F43.21)           Adjustment Disorder, With depressed mood  296.26 (F32.5)           Major Depressive Disorder, In full remission, Single Episode    V61.10 (Z63.0)           Relationship Distress with Spouse or Intimate Partner    Plan/Need for Future Services:  Return for therapy in two weeks to treat diagnosed problems.        Kin Panchal, PhD., LMFT, Postdoctoral fellow

## 2018-12-03 ENCOUNTER — OFFICE VISIT (OUTPATIENT)
Dept: OTHER | Facility: OUTPATIENT CENTER | Age: 38
End: 2018-12-03
Payer: COMMERCIAL

## 2018-12-03 DIAGNOSIS — F32.5 MAJOR DEPRESSION IN COMPLETE REMISSION (H): ICD-10-CM

## 2018-12-03 DIAGNOSIS — F43.21 ADJUSTMENT DISORDER WITH DEPRESSED MOOD: ICD-10-CM

## 2018-12-03 NOTE — PROGRESS NOTES
[facility] -  Case Progress Note    Date of Service: 18   Name: Garfield Kuhn  : 1980  Medical Record Number: [id number]  Treating Provider: Kin Panchal, PhD., [facility]  Type of Session: Couple  Present in Session: MH, client; LH, client's spouse; NN, therapist.  Number of Minutes:  60    Current Symptoms/Status:  Client is currently struggling with depressive symptoms. These symptoms include low mood, excessive shame/guilt, and suicidal ideation. These symptoms have interfered with his relationship with his wife. Additionally, client is struggling with the recent disclosure that he had been having an extramarital affair. This has caused relationship stress with his wife and has increased his feelings of guilt/shame.    Progress Toward Treatment Goals:   Client and his wife stated they did not have the opportunity to fight. Client's wife stated that she was sad due to the fact that they were not able to work out their issues prior to their affair; wishes she could have been more responsive to how her  was feeling. Client's wife stated that she was triggered when thinking about the affair; asked when will she stop thinking about it. Client stated he has been experiencing work stress; client's wife was able to point out that he has been taking his work stress home with him. Client discussed how he appreciated his wife's feedback. Stated that he still holds shame/guilt related to the affair and is frustrated that he cannot let this go.    Intervention: Modality and Description:  Therapist used didactic strategies to educate client and his wife on expectations for emotional intimacy post affair; used CBT to challenge client's shame and mind reading that contribute to emotional distance.    Response to Intervention:  Client's wife appeared receptive to didactic strategies. Client appeared to struggle challenging his shame and guilt that he holds against himself.       Assignment:  Client will journal about aspects of the affair that he believes are important for his partner to know; share at next session.    DSM-5 Diagnoses:  309.0 (F43.21)           Adjustment Disorder, With depressed mood  296.26 (F32.5)           Major Depressive Disorder, In full remission, Single Episode  V61.10 (Z63.0)           Relationship Distress with Spouse or Intimate Partner    Plan/Need for Future Services:  Return for therapy in two weeks to treat diagnosed problems.        Kin Panchal, PhD., LMFT, Postdoctoral fellow

## 2018-12-03 NOTE — MR AVS SNAPSHOT
After Visit Summary   12/3/2018    Garfield Kuhn    MRN: [id number]           Patient Information     Date Of Birth          1980        Visit Information        Provider Department      12/3/2018 8:30 AM Kin Panchal LMFT [facility]        Today's Diagnoses     Major depression in complete remission (H)        Adjustment disorder with depressed mood           Follow-ups after your visit        Your next 10 appointments already scheduled     Dec 17, 2018  8:30 AM CST   Family Therapy with REBECA Corrales   [facility] ([facility])    [address]  Mail [address] 7521  [facility] [address]   [phone]              Who to contact     Please call your clinic at [phone] to:    Ask questions about your health    Make or cancel appointments    Discuss your medicines    Learn about your test results    Speak to your doctor            Additional Information About Your Visit        MyChart Information     [email or web address] is an electronic gateway that provides easy, online access to your medical records. With Pushfor, you can request a clinic appointment, read your test results, renew a prescription or communicate with your care team.     To sign up for [email or web address] visit the website at [email or web address]   You will be asked to enter the access code listed below, as well as some personal information. Please follow the directions to create your username and password.     Your access code is: [email or web address]  Expires: 2018  4:56 PM     Your access code will  in 90 days. If you need help or a new code, please contact your [facility] or call [phone] for assistance.        Care EveryWhere ID     This is your Care EveryWhere ID. This could be used by other organizations to access your [facility] medical records  [id number]         Blood Pressure from Last 3 Encounters:   05 110/80   10/11/05 122/70     Weight from Last 3 Encounters:   10/11/05 118.9 kg (262 lb 3.2 oz)              We Performed the Following     Psychotherapy Family/Couple with Patient [[id number]]        Primary Care Provider Office Phone # Fax #    Waqar Stokes [phone]       NO INFO FOUND  [facility] [address]        Equal Access to Services     [facility]: Hadii aad ku hadasho Soomaali, waaxda luqadaha, qaybta kaalmada adeegyada, waxay idiin hayaan adeeg varinder lamaryn . So [facility] [phone].    ATENCIÓN: Si habla español, tiene a mensah disposición servicios gratuitos de asistencia lingüística. Keanuame al [phone].    We comply with applicable federal civil rights laws and [place] laws. We do not discriminate on the basis of race, color, national origin, age, disability, sex, sexual orientation, or gender identity.            Thank you!     Thank you for choosing [facility]  for your care. Our goal is always to provide you with excellent care. Hearing back from our patients is one way we can continue to improve our services. Please take a few minutes to complete the written survey that you may receive in the mail after your visit with us. Thank you!             Your Updated Medication List - Protect others around you: Learn how to safely use, store and throw away your medicines at www.disposemymeds.org.          This list is accurate as of 12/3/18  1:52 PM.  Always use your most recent med list.                   Brand Name Dispense Instructions for use Diagnosis    EFFEXOR  MG 24 hr capsule   Generic drug:  venlafaxine      1 CAPSULE DAILY WITH FOOD        FLONASE 50 MCG/DOSE nasal spray     1    2 SPRAYS IN EACH NOSTRIL QD (Once per day)    Depressive disorder, not elsewhere classified       LEXAPRO 20 MG tablet   Generic drug:  escitalopram      1 TABLET DAILY        LORazepam 0.5 MG tablet    ATIVAN     1 TABLET TWICE DAILY

## 2020-06-09 ENCOUNTER — DOCUMENTATION ONLY (OUTPATIENT)
Dept: OTHER | Facility: OUTPATIENT CENTER | Age: 40
End: 2020-06-09

## 2020-06-09 NOTE — PROGRESS NOTES
[facility] Case Closing Summary    Name:  Garfield Kuhn  Diagnosis:    296.26 (F32.5)           Major Depressive Disorder, In full remission, Single Episode  309.0 (F43.21)          Adjustment Disorder, With depressed mood  V61.10 (Z63.0)           Relationship Distress with Spouse or Intimate Partner  Program: REST  Date of Summary: 6/9/2020  Date of Initial [facility] Appointment:  9/25/2018  Date of Last [facility] Appointment:   12/3/2018    Evaluation of Client Status:    Total number of Individual, conjoint, group sessions patient attended:  3 - 6    Goals at intake were:   Assess and provide treatment suggestions  Prepare for ongoing therapy    At the time of discharge, the client reported the following progress:  Increased insight  Better interpersonal relationships    Therapist Assessment:  % of goals met:  30 %  %of symptoms reversal:  30 %  % treatment effectiveness:   30 %    Prognosis:    Modest    Referred to:  NA     Electronically Signed by:    Kin Panchal, PhD., LMFT, Postdoctoral fellow

## 2020-12-18 ENCOUNTER — THERAPY VISIT (OUTPATIENT)
Dept: PHYSICAL THERAPY | Facility: CLINIC | Age: 40
End: 2020-12-18
Payer: COMMERCIAL

## 2020-12-18 DIAGNOSIS — M25.551 HIP PAIN, RIGHT: ICD-10-CM

## 2020-12-18 PROCEDURE — 97161 PT EVAL LOW COMPLEX 20 MIN: CPT | Mod: GP | Performed by: PHYSICAL THERAPIST

## 2020-12-18 PROCEDURE — 97110 THERAPEUTIC EXERCISES: CPT | Mod: GP | Performed by: PHYSICAL THERAPIST

## 2020-12-18 PROCEDURE — 97530 THERAPEUTIC ACTIVITIES: CPT | Mod: GP | Performed by: PHYSICAL THERAPIST

## 2020-12-18 NOTE — LETTER
[facility]  [address]  [facility] [address]  [phone]    2020    Re: Garfield Kuhn   :   1980  MRN:  [id number]   REFERRING PHYSICIAN:   Alex Gupta    [facility]  Date of Initial Evaluation:  20  Visits:  Rxs Used: 1  Reason for Referral:  Hip pain, right    EVALUATION SUMMARY  [facility] Initial Evaluation  Physical Therapy Initial Examination/Evaluation  2020  Subjective:  Garfield Kuhn  is a 40 year old  male referred to physical therapy by Dr. Alex Gupta for treatment of R hip pain.    DOI/onset 20  Mechanism of injury Patient reports onset of R hip pain around  w/out incident. The pain became severe causing him to limp with every step on  prompting him to seek help.  DOS n/a  Prior treatment none.   Chief Complaint:   Limited walking/mobility due to pain.   Pain location: R anterior hip/groin  Quality: sharp  Constant/Intermittent: intermittent  Time of day: same at all times  Symptoms have worsened since onset.    Current pain 7/10.  Pain at best 5/10.  Pain at worst 8/10.    Symptoms aggravated by walking, stairs, lifting leg into bed.    Symptoms improved with rest.   Occupation: management.  Job duties:  Computer work.    Patient having difficulty with ADLs: transfers, walking.    Patient's goals are to reduce pain.  Patient reports general health as good.  Related medical history no previous hx of R hip issues.    Surgical History:  none.    Imaging: upcoming MRI .    Medications:  none.    Return to MD:  Recheck with MD after MRI.    Clinical Impression: Patient presents with significant R hip pain limiting his strength and mobility. He will start with some gentle hip ROM exercises then wait until the results of his MRI to determine further PT direction.    Objective:  Standing Alignment:    Pelvic:   Normal  Hip:  Normal  Re: Garfield Kuhn   :   1980    Gait:  Marked limp on R with every step  Gait Type:  Antalgic     Flexibility/Screens:   Positive screens:  Hip  Lower Extremity:  Decreased left lower extremity flexibility:Hip ER's  Decreased right lower extremity flexibility:  Hip ER's  Hip Evaluation  Hip PROM:    Flexion: Left: 120   Right: 90  Internal Rotation: Left: 20    Right: 5  External Rotation: Left: 55    Right: 45  Hip Strength:    Flexion:   Right: 4/5   Pain:                Extension:  Right: 4+/5    Pain:    Abduction:  Right: 4-/5   +   Pain:  Adduction:  Right: 4-/5   ++  Pain:  Internal Rotation:  Right: 4-/5   +  Pain:  External Rotation:  Right: 4/5   Pain:  Hip Special Testing:    Right hip positive for the following special tests:  Fadir/Labrum    Hip Palpation:  Normal     Assessment/Plan:    Patient is a 40 year old male with right side hip complaints.    Patient has the following significant findings with corresponding treatment plan.                Diagnosis 1:  R hip pain  Pain -  manual therapy, self management and education  Decreased ROM/flexibility - manual therapy and therapeutic exercise  Decreased strength - therapeutic exercise and therapeutic activities  Impaired gait - gait training  Impaired muscle performance - neuro re-education  Decreased function - therapeutic activities    Therapy Evaluation Codes:   1) History comprised of:   Personal factors that impact the plan of care:      None.    Comorbidity factors that impact the plan of care are:      None.     Medications impacting care: None.  2) Examination of Body Systems comprised of:   Body structures and functions that impact the plan of care:      Hip.   Activity limitations that impact the plan of care are:      Walking.  3) Clinical presentation characteristics are:   Stable/Uncomplicated.  4) Decision-Making    Low complexity using standardized patient assessment instrument and/or measureable assessment  of functional outcome.  Cumulative Therapy Evaluation is: Low complexity.  Re: Garfield Kuhn   :   1980    Previous and current functional limitations:  (See Goal Flow Sheet for this information)    Short term and Long term goals: (See Goal Flow Sheet for this information)     Communication ability:  Patient appears to be able to clearly communicate and understand verbal and written communication and follow directions correctly.  Treatment Explanation - The following has been discussed with the patient:   RX ordered/plan of care  Anticipated outcomes  Possible risks and side effects  This patient would benefit from PT intervention to resume normal activities.   Rehab potential is fair.    Frequency:  1 X week, once daily  Duration:  for 6 weeks  Discharge Plan:  Achieve all LTG.  Independent in home treatment program.  Reach maximal therapeutic benefit.    We will hold on further PT until we find out if the results of his MRI next week suggest continued PT intervention.    Thank you for your referral.    INQUIRIES  Therapist: Xiao Jurado, PT  [facility]  [address] [place] [address] [address] [address]  Phone: [phone]  Fax: [phone]

## 2020-12-21 PROBLEM — M25.551 HIP PAIN, RIGHT: Status: ACTIVE | Noted: 2020-12-21

## 2020-12-21 ASSESSMENT — ACTIVITIES OF DAILY LIVING (ADL)
HEAVY_WORK: EXTREME DIFFICULTY
HOS_ADL_ITEM_SCORE_TOTAL: 27
GOING_DOWN_1_FLIGHT_OF_STAIRS: MODERATE DIFFICULTY
HOS_ADL_HIGHEST_POTENTIAL_SCORE: 64
ROLLING_OVER_IN_BED: MODERATE DIFFICULTY
HOS_ADL_SCORE(%): 42.19
SITTING_FOR_15_MINUTES: MODERATE DIFFICULTY
WALKING_INITIALLY: EXTREME DIFFICULTY
WALKING_DOWN_STEEP_HILLS: MODERATE DIFFICULTY
LIGHT_TO_MODERATE_WORK: MODERATE DIFFICULTY
HOS_ADL_COUNT: 16
GOING_UP_1_FLIGHT_OF_STAIRS: MODERATE DIFFICULTY
RECREATIONAL_ACTIVITIES: UNABLE TO DO
STANDING_FOR_15_MINUTES: MODERATE DIFFICULTY
WALKING_UP_STEEP_HILLS: SLIGHT DIFFICULTY
DEEP_SQUATTING: UNABLE TO DO
STEPPING_UP_AND_DOWN_CURBS: SLIGHT DIFFICULTY
TWISTING/PIVOTING_ON_INVOLVED_LEG: EXTREME DIFFICULTY
GETTING_INTO_AND_OUT_OF_AN_AVERAGE_CAR: MODERATE DIFFICULTY
PUTTING_ON_SOCKS_AND_SHOES: EXTREME DIFFICULTY
WALKING_APPROXIMATELY_10_MINUTES: MODERATE DIFFICULTY
WALKING_15_MINUTES_OR_GREATER: MODERATE DIFFICULTY

## 2020-12-21 NOTE — PROGRESS NOTES
[facility] Initial Evaluation    Physical Therapy Initial Examination/Evaluation  December 18, 2020    Garfield Kuhn  is a 40 year old  male referred to physical therapy by Dr. Alex Gupta for treatment of R hip pain.      DOI/onset 12-1-20  Mechanism of injury Patient reports onset of R hip pain around 12-1 w/out incident. The pain became severe causing him to limp with every step on 12-14 prompting him to seek help.  DOS n/a  Prior treatment none.     Chief Complaint:   Limited walking/mobility due to pain.   Pain location: R anterior hip/groin  Quality: sharp  Constant/Intermittent: intermittent  Time of day: same at all times  Symptoms have worsened since onset.    Current pain 7/10.  Pain at best 5/10.  Pain at worst 8/10.    Symptoms aggravated by walking, stairs, lifting leg into bed.    Symptoms improved with rest.       Occupation: management.  Job duties:  Computer work.    Patient having difficulty with ADLs: transfers, walking.    Patient's goals are to reduce pain.    Patient reports general health as good.  Related medical history no previous hx of R hip issues.    Surgical History:  none.    Imaging: upcoming MRI 12-22.    Medications:  none.       Return to MD:  Recheck with MD after MRI.      Clinical Impression: Patient presents with significant R hip pain limiting his strength and mobility. He will start with some gentle hip ROM exercises then wait until the results of his MRI to determine further PT direction.    Subjective:  HPI                    Objective:  Standing Alignment:          Pelvic:  Normal  Hip:  Normal        Gait:  Marked limp on R with every step  Gait Type:  Antalgic         Flexibility/Screens:   Positive screens:  Hip    Lower Extremity:  Decreased left lower extremity flexibility:Hip ER's    Decreased right lower extremity flexibility:  Hip ER's                                                 Hip Evaluation  Hip PROM:    Flexion: Left: 120   Right:  90        Internal Rotation: Left: 20    Right: 5  External Rotation: Left: 55    Right: 45              Hip Strength:    Flexion:   Right: 4/5   Pain:                    Extension:  Right: 4+/5    Pain:    Abduction:  Right: 4-/5   +   Pain:  Adduction:  Right: 4-/5   ++  Pain:  Internal Rotation:  Right: 4-/5   +  Pain:  External Rotation:  Right: 4/5   Pain:            Hip Special Testing:       Right hip positive for the following special tests:  Fadir/Labrum    Hip Palpation:  Normal                    General     ROS    Assessment/Plan:    Patient is a 40 year old male with right side hip complaints.    Patient has the following significant findings with corresponding treatment plan.                Diagnosis 1:  R hip pain  Pain -  manual therapy, self management and education  Decreased ROM/flexibility - manual therapy and therapeutic exercise  Decreased strength - therapeutic exercise and therapeutic activities  Impaired gait - gait training  Impaired muscle performance - neuro re-education  Decreased function - therapeutic activities    Therapy Evaluation Codes:   1) History comprised of:   Personal factors that impact the plan of care:      None.    Comorbidity factors that impact the plan of care are:      None.     Medications impacting care: None.  2) Examination of Body Systems comprised of:   Body structures and functions that impact the plan of care:      Hip.   Activity limitations that impact the plan of care are:      Walking.  3) Clinical presentation characteristics are:   Stable/Uncomplicated.  4) Decision-Making    Low complexity using standardized patient assessment instrument and/or measureable assessment of functional outcome.  Cumulative Therapy Evaluation is: Low complexity.    Previous and current functional limitations:  (See Goal Flow Sheet for this information)    Short term and Long term goals: (See Goal Flow Sheet for this information)     Communication ability:  Patient appears to  be able to clearly communicate and understand verbal and written communication and follow directions correctly.  Treatment Explanation - The following has been discussed with the patient:   RX ordered/plan of care  Anticipated outcomes  Possible risks and side effects  This patient would benefit from PT intervention to resume normal activities.   Rehab potential is fair.    Frequency:  1 X week, once daily  Duration:  for 6 weeks  Discharge Plan:  Achieve all LTG.  Independent in home treatment program.  Reach maximal therapeutic benefit.    We will hold on further PT until we find out if the results of his MRI next week suggest continued PT intervention.    Please refer to the daily flowsheet for treatment today, total treatment time and time spent performing 1:1 timed codes.

## 2021-03-20 ENCOUNTER — HEALTH MAINTENANCE LETTER (OUTPATIENT)
Age: 41
End: 2021-03-20

## 2021-03-24 PROBLEM — M25.551 HIP PAIN, RIGHT: Status: RESOLVED | Noted: 2020-12-21 | Resolved: 2021-03-24

## 2021-09-04 ENCOUNTER — HEALTH MAINTENANCE LETTER (OUTPATIENT)
Age: 41
End: 2021-09-04

## 2021-09-09 ENCOUNTER — THERAPY VISIT (OUTPATIENT)
Dept: PHYSICAL THERAPY | Facility: CLINIC | Age: 41
End: 2021-09-09
Payer: COMMERCIAL

## 2021-09-09 DIAGNOSIS — M25.511 CHRONIC RIGHT SHOULDER PAIN: Primary | ICD-10-CM

## 2021-09-09 DIAGNOSIS — G25.89 SCAPULAR DYSKINESIS: ICD-10-CM

## 2021-09-09 DIAGNOSIS — G89.29 CHRONIC RIGHT SHOULDER PAIN: Primary | ICD-10-CM

## 2021-09-09 PROCEDURE — 97161 PT EVAL LOW COMPLEX 20 MIN: CPT | Mod: GP | Performed by: PHYSICAL THERAPIST

## 2021-09-09 PROCEDURE — 97112 NEUROMUSCULAR REEDUCATION: CPT | Mod: GP | Performed by: PHYSICAL THERAPIST

## 2021-09-09 PROCEDURE — 97110 THERAPEUTIC EXERCISES: CPT | Mod: GP | Performed by: PHYSICAL THERAPIST

## 2021-09-09 NOTE — LETTER
[facility] [address]  [facility] [address]  [phone]    2021    Re: Garfield Kuhn   :   1980  MRN:  [id number]   REFERRING PHYSICIAN:   Tayo HAWKINS [facility]    Date of Initial Evaluation:  21  Visits:  Rxs Used: 1  Reason for Referral:     Chronic right shoulder pain  Scapular dyskinesis    Physical Therapy Initial Evaluation  Subjective:  The history is provided by the patient.     Patient Health History  Garfield Kuhn being seen for R shoulder pain.   Problem began: 2021.   Problem occurred: Ongoing shoulder pain with workouts (bench press). 3-4 months ago pain became more constant, sleeping/laying on that side cause inc pain the next day   Pain is reported as 7/10 on pain scale.  General health as reported by patient is good.  Pertinent medical history includes: depression.       Current medications:  Anti-depressants.    Current occupation is Management.   Primary job tasks include:  Computer work.   Other job/home tasks details: R handed, side sleeper. Peloton workouts or lifting at the gym.                Therapist Generated HPI Evaluation     Type of problem:  Right shoulder.  This is a new condition.  Condition occurred with:  Repetition/overuse.  Where condition occurred: other.  Patient reports pain:  Lateral.  Pain is described as aching and is intermittent.  Pain radiates to:  Shoulder. Pain is worse in the P.M..  Since onset symptoms are gradually worsening.  Associated symptoms:  Loss of motion/stiffness and loss of strength. Symptoms are exacerbated by lying on extremity, using arm behind back, using arm overhead and using arm at shoulder level  and relieved by rest.  Special tests included:  X-ray.        Re: Garfield Kuhn   :   1980                  Objective:  Standing Alignment:    Cervical/Thoracic:  Forward head  Shoulder/UE:  Rounded shoulders and  depressed scapula R       Shoulder Evaluation:  ROM:  AROM:    Flexion:  Left:  Wnl    Right:  Painful throughout motion, scap dyskinesia pronounced on descent  Abduction:  Left: wnl   Right:  Painful arc mid-range; scap dyskinesia on descent  External Rotation:  Left:  Wnl    Right:  Crepitus, and ant humeral head  Extension/Internal Rotation:  Left:  T12    Right:  Painful L4      Strength:  : strength intact; painful flex and IR on R.  Special Tests:    Right shoulder positive for the following special tests:Impingement  Right shoulder negative for the following special tests:Rotator cuff tear  Palpation:    Right shoulder tenderness present at: Acrimioclavicular; Subscapularis and Bicipital Groove  Mobility Tests:    Glenohumeral posterior right:  Hypomobile  Scapulothoracic right:  Hypermobile  Scapulohumeral rhythm right:  Hypermobile        General   ROS    Assessment/Plan:    Patient is a 41 year old male with right side shoulder complaints.    Patient has the following significant findings with corresponding treatment plan.                R shoulder mechanical shoulder pain with likely subacromial bursitis    Pain -  hot/cold therapy, manual therapy, self management, education and home program  Decreased ROM/flexibility - manual therapy, therapeutic exercise and home program  Decreased joint mobility - manual therapy, therapeutic exercise and home program  Decreased strength - therapeutic exercise, therapeutic activities and home program  Decreased proprioception - neuro re-education, therapeutic activities and home program  Impaired posture - neuro re-education and home program    Therapy Evaluation Codes:   Cumulative Therapy Evaluation is: Low complexity.  Communication ability:  Patient appears to be able to clearly communicate and understand verbal and written communication and follow directions correctly.  Treatment Explanation - The following has been discussed with the patient:   RX ordered/plan of  care  Anticipated outcomes  Possible risks and side effects  This patient would benefit from PT intervention to resume normal activities.   Rehab potential is excellent.  Re: Garfield Kuhn   :   1980    Frequency:  1 X week, once daily  Duration:  for 4 weeks tapering to 2 X a month over 4 weeks  Discharge Plan:  Achieve all LTG.  Independent in home treatment program.  Reach maximal therapeutic benefit.    Thank you for your referral.    INQUIRIES  Therapist: Linda Garcia DPT   [address] [address]  Phone: [phone]  Fax: [phone]

## 2021-09-09 NOTE — PROGRESS NOTES
Physical Therapy Initial Evaluation  Subjective:  The history is provided by the patient.   Patient Health History  Garfield SCOTT Kuhn being seen for R shoulder pain.     Problem began: 8/19/2021.   Problem occurred: Ongoing shoulder pain with workouts (bench press). 3-4 months ago pain became more constant, sleeping/laying on that side cause inc pain the next day   Pain is reported as 7/10 on pain scale.  General health as reported by patient is good.  Pertinent medical history includes: depression.            Current medications:  Anti-depressants.    Current occupation is Management.   Primary job tasks include:  Computer work.   Other job/home tasks details: R handed, side sleeper. Peloton workouts or lifting at the gym.                Therapist Generated HPI Evaluation         Type of problem:  Right shoulder.    This is a new condition.  Condition occurred with:  Repetition/overuse.  Where condition occurred: other.  Patient reports pain:  Lateral.  Pain is described as aching and is intermittent.  Pain radiates to:  Shoulder. Pain is worse in the P.M..  Since onset symptoms are gradually worsening.  Associated symptoms:  Loss of motion/stiffness and loss of strength. Symptoms are exacerbated by lying on extremity, using arm behind back, using arm overhead and using arm at shoulder level  and relieved by rest.  Special tests included:  X-ray.                            Objective:  Standing Alignment:    Cervical/Thoracic:  Forward head  Shoulder/UE:  Rounded shoulders and depressed scapula R                                       Shoulder Evaluation:  ROM:  AROM:    Flexion:  Left:  Wnl    Right:  Painful throughout motion, scap dyskinesia pronounced on descent    Abduction:  Left: wnl   Right:  Painful arc mid-range; scap dyskinesia on descent      External Rotation:  Left:  Wnl    Right:  Crepitus, and ant humeral head            Extension/Internal Rotation:  Left:  T12    Right:  Painful L4          Strength:  :  strength intact; painful flex and IR on R.                        Special Tests:      Right shoulder positive for the following special tests:Impingement  Right shoulder negative for the following special tests:Rotator cuff tear  Palpation:      Right shoulder tenderness present at: Acrimioclavicular; Subscapularis and Bicipital Groove  Mobility Tests:      Glenohumeral posterior right:  Hypomobile      Scapulothoracic right:  Hypermobile    Scapulohumeral rhythm right:  Hypermobile                                   General     ROS    Assessment/Plan:    Patient is a 41 year old male with right side shoulder complaints.    Patient has the following significant findings with corresponding treatment plan.                R shoulder mechanical shoulder pain with likely subacromial bursitis    Pain -  hot/cold therapy, manual therapy, self management, education and home program  Decreased ROM/flexibility - manual therapy, therapeutic exercise and home program  Decreased joint mobility - manual therapy, therapeutic exercise and home program  Decreased strength - therapeutic exercise, therapeutic activities and home program  Decreased proprioception - neuro re-education, therapeutic activities and home program  Impaired posture - neuro re-education and home program    Therapy Evaluation Codes:     Cumulative Therapy Evaluation is: Low complexity.    Previous and current functional limitations:  (See Goal Flow Sheet for this information)    Short term and Long term goals: (See Goal Flow Sheet for this information)     Communication ability:  Patient appears to be able to clearly communicate and understand verbal and written communication and follow directions correctly.  Treatment Explanation - The following has been discussed with the patient:   RX ordered/plan of care  Anticipated outcomes  Possible risks and side effects  This patient would benefit from PT intervention to resume normal activities.   Rehab potential is  excellent.    Frequency:  1 X week, once daily  Duration:  for 4 weeks tapering to 2 X a month over 4 weeks  Discharge Plan:  Achieve all LTG.  Independent in home treatment program.  Reach maximal therapeutic benefit.    Please refer to the daily flowsheet for treatment today, total treatment time and time spent performing 1:1 timed codes.

## 2021-09-16 ENCOUNTER — THERAPY VISIT (OUTPATIENT)
Dept: PHYSICAL THERAPY | Facility: CLINIC | Age: 41
End: 2021-09-16
Payer: COMMERCIAL

## 2021-09-16 DIAGNOSIS — G89.29 CHRONIC RIGHT SHOULDER PAIN: Primary | ICD-10-CM

## 2021-09-16 DIAGNOSIS — M25.551 HIP PAIN, RIGHT: ICD-10-CM

## 2021-09-16 DIAGNOSIS — G25.89 SCAPULAR DYSKINESIS: ICD-10-CM

## 2021-09-16 DIAGNOSIS — M25.511 CHRONIC RIGHT SHOULDER PAIN: Primary | ICD-10-CM

## 2021-09-16 PROCEDURE — 97112 NEUROMUSCULAR REEDUCATION: CPT | Mod: GP | Performed by: PHYSICAL THERAPIST

## 2021-09-16 PROCEDURE — 97110 THERAPEUTIC EXERCISES: CPT | Mod: GP | Performed by: PHYSICAL THERAPIST

## 2021-10-06 ENCOUNTER — THERAPY VISIT (OUTPATIENT)
Dept: PHYSICAL THERAPY | Facility: CLINIC | Age: 41
End: 2021-10-06
Payer: COMMERCIAL

## 2021-10-06 DIAGNOSIS — G25.89 SCAPULAR DYSKINESIS: ICD-10-CM

## 2021-10-06 DIAGNOSIS — G89.29 CHRONIC RIGHT SHOULDER PAIN: Primary | ICD-10-CM

## 2021-10-06 DIAGNOSIS — M25.511 CHRONIC RIGHT SHOULDER PAIN: Primary | ICD-10-CM

## 2021-10-06 PROCEDURE — 97110 THERAPEUTIC EXERCISES: CPT | Mod: GP | Performed by: PHYSICAL THERAPIST

## 2021-10-06 PROCEDURE — 97112 NEUROMUSCULAR REEDUCATION: CPT | Mod: GP | Performed by: PHYSICAL THERAPIST

## 2021-10-19 PROBLEM — F32.9 MAJOR DEPRESSION: Status: ACTIVE | Noted: 2018-09-25

## 2021-10-20 ENCOUNTER — THERAPY VISIT (OUTPATIENT)
Dept: PHYSICAL THERAPY | Facility: CLINIC | Age: 41
End: 2021-10-20
Payer: COMMERCIAL

## 2021-10-20 DIAGNOSIS — G25.89 SCAPULAR DYSKINESIS: ICD-10-CM

## 2021-10-20 DIAGNOSIS — G89.29 CHRONIC RIGHT SHOULDER PAIN: Primary | ICD-10-CM

## 2021-10-20 DIAGNOSIS — M25.511 CHRONIC RIGHT SHOULDER PAIN: Primary | ICD-10-CM

## 2021-10-20 PROCEDURE — 97112 NEUROMUSCULAR REEDUCATION: CPT | Mod: GP | Performed by: PHYSICAL THERAPIST

## 2021-10-20 PROCEDURE — 97110 THERAPEUTIC EXERCISES: CPT | Mod: GP | Performed by: PHYSICAL THERAPIST

## 2021-10-20 PROCEDURE — 97140 MANUAL THERAPY 1/> REGIONS: CPT | Mod: GP | Performed by: PHYSICAL THERAPIST

## 2021-11-11 ENCOUNTER — THERAPY VISIT (OUTPATIENT)
Dept: PHYSICAL THERAPY | Facility: CLINIC | Age: 41
End: 2021-11-11
Payer: COMMERCIAL

## 2021-11-11 DIAGNOSIS — G89.29 CHRONIC RIGHT SHOULDER PAIN: Primary | ICD-10-CM

## 2021-11-11 DIAGNOSIS — G25.89 SCAPULAR DYSKINESIS: ICD-10-CM

## 2021-11-11 DIAGNOSIS — M25.511 CHRONIC RIGHT SHOULDER PAIN: Primary | ICD-10-CM

## 2021-11-11 PROCEDURE — 97140 MANUAL THERAPY 1/> REGIONS: CPT | Mod: GP | Performed by: PHYSICAL THERAPIST

## 2021-11-11 PROCEDURE — 97110 THERAPEUTIC EXERCISES: CPT | Mod: GP | Performed by: PHYSICAL THERAPIST

## 2022-04-16 ENCOUNTER — HEALTH MAINTENANCE LETTER (OUTPATIENT)
Age: 42
End: 2022-04-16

## 2022-10-22 ENCOUNTER — HEALTH MAINTENANCE LETTER (OUTPATIENT)
Age: 42
End: 2022-10-22

## 2023-02-20 ENCOUNTER — APPOINTMENT (OUTPATIENT)
Dept: CT IMAGING | Facility: CLINIC | Age: 43
DRG: 522 | End: 2023-02-20
Attending: EMERGENCY MEDICINE
Payer: COMMERCIAL

## 2023-02-20 ENCOUNTER — APPOINTMENT (OUTPATIENT)
Dept: GENERAL RADIOLOGY | Facility: CLINIC | Age: 43
DRG: 522 | End: 2023-02-20
Attending: EMERGENCY MEDICINE
Payer: COMMERCIAL

## 2023-02-20 ENCOUNTER — HOSPITAL ENCOUNTER (INPATIENT)
Facility: CLINIC | Age: 43
LOS: 3 days | Discharge: HOME HEALTH | DRG: 522 | End: 2023-02-23
Attending: EMERGENCY MEDICINE | Admitting: STUDENT IN AN ORGANIZED HEALTH CARE EDUCATION/TRAINING PROGRAM
Payer: COMMERCIAL

## 2023-02-20 DIAGNOSIS — S72.002A CLOSED DISPLACED FRACTURE OF LEFT FEMORAL NECK (H): Primary | ICD-10-CM

## 2023-02-20 DIAGNOSIS — S72.012A CLOSED SUBCAPITAL FRACTURE OF LEFT FEMUR, INITIAL ENCOUNTER (H): ICD-10-CM
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
## 2023-02-20 PROBLEM — S72.009A HIP FRACTURE (H): Status: ACTIVE | Noted: 2023-02-20

## 2023-02-20 LAB
ANION GAP SERPL CALCULATED.3IONS-SCNC: 8 MMOL/L (ref 7–15)
BASOPHILS # BLD AUTO: 0 10E3/UL (ref 0–0.2)
BASOPHILS NFR BLD AUTO: 0 %
BUN SERPL-MCNC: 16 MG/DL (ref 6–20)
CALCIUM SERPL-MCNC: 9 MG/DL (ref 8.6–10)
CHLORIDE SERPL-SCNC: 102 MMOL/L (ref 98–107)
CREAT SERPL-MCNC: 1.02 MG/DL (ref 0.67–1.17)
DEPRECATED HCO3 PLAS-SCNC: 26 MMOL/L (ref 22–29)
EOSINOPHIL # BLD AUTO: 0.2 10E3/UL (ref 0–0.7)
EOSINOPHIL NFR BLD AUTO: 2 %
ERYTHROCYTE [DISTWIDTH] IN BLOOD BY AUTOMATED COUNT: 12.7 % (ref 10–15)
GFR SERPL CREATININE-BSD FRML MDRD: >90 ML/MIN/1.73M2
GLUCOSE SERPL-MCNC: 114 MG/DL (ref 70–99)
HCT VFR BLD AUTO: 38.4 % (ref 40–53)
HGB BLD-MCNC: 12.5 G/DL (ref 13.3–17.7)
IMM GRANULOCYTES # BLD: 0 10E3/UL
IMM GRANULOCYTES NFR BLD: 0 %
LYMPHOCYTES # BLD AUTO: 0.9 10E3/UL (ref 0.8–5.3)
LYMPHOCYTES NFR BLD AUTO: 8 %
MCH RBC QN AUTO: 28.5 PG (ref 26.5–33)
MCHC RBC AUTO-ENTMCNC: 32.6 G/DL (ref 31.5–36.5)
MCV RBC AUTO: 88 FL (ref 78–100)
MONOCYTES # BLD AUTO: 0.3 10E3/UL (ref 0–1.3)
MONOCYTES NFR BLD AUTO: 3 %
NEUTROPHILS # BLD AUTO: 9.8 10E3/UL (ref 1.6–8.3)
NEUTROPHILS NFR BLD AUTO: 87 %
NRBC # BLD AUTO: 0 10E3/UL
NRBC BLD AUTO-RTO: 0 /100
PLATELET # BLD AUTO: 194 10E3/UL (ref 150–450)
POTASSIUM SERPL-SCNC: 3.7 MMOL/L (ref 3.4–5.3)
RBC # BLD AUTO: 4.38 10E6/UL (ref 4.4–5.9)
SODIUM SERPL-SCNC: 136 MMOL/L (ref 136–145)
WBC # BLD AUTO: 11.4 10E3/UL (ref 4–11)

## 2023-02-20 PROCEDURE — 250N000011 HC RX IP 250 OP 636: Performed by: STUDENT IN AN ORGANIZED HEALTH CARE EDUCATION/TRAINING PROGRAM

## 2023-02-20 PROCEDURE — 73502 X-RAY EXAM HIP UNI 2-3 VIEWS: CPT

## 2023-02-20 PROCEDURE — 99285 EMERGENCY DEPT VISIT HI MDM: CPT | Mod: 25

## 2023-02-20 PROCEDURE — 96374 THER/PROPH/DIAG INJ IV PUSH: CPT

## 2023-02-20 PROCEDURE — 250N000013 HC RX MED GY IP 250 OP 250 PS 637: Performed by: EMERGENCY MEDICINE

## 2023-02-20 PROCEDURE — 250N000011 HC RX IP 250 OP 636: Performed by: EMERGENCY MEDICINE

## 2023-02-20 PROCEDURE — 85025 COMPLETE CBC W/AUTO DIFF WBC: CPT | Performed by: EMERGENCY MEDICINE

## 2023-02-20 PROCEDURE — 99222 1ST HOSP IP/OBS MODERATE 55: CPT | Mod: AI | Performed by: STUDENT IN AN ORGANIZED HEALTH CARE EDUCATION/TRAINING PROGRAM

## 2023-02-20 PROCEDURE — 80048 BASIC METABOLIC PNL TOTAL CA: CPT | Performed by: EMERGENCY MEDICINE

## 2023-02-20 PROCEDURE — 120N000001 HC R&B MED SURG/OB

## 2023-02-20 PROCEDURE — 36415 COLL VENOUS BLD VENIPUNCTURE: CPT | Performed by: EMERGENCY MEDICINE

## 2023-02-20 PROCEDURE — 250N000013 HC RX MED GY IP 250 OP 250 PS 637: Performed by: STUDENT IN AN ORGANIZED HEALTH CARE EDUCATION/TRAINING PROGRAM

## 2023-02-20 PROCEDURE — 73700 CT LOWER EXTREMITY W/O DYE: CPT | Mod: LT

## 2023-02-20 RX ORDER — HYDROMORPHONE HCL IN WATER/PF 6 MG/30 ML
0.4 PATIENT CONTROLLED ANALGESIA SYRINGE INTRAVENOUS
Status: DISCONTINUED | OUTPATIENT
Start: 2023-02-20 | End: 2023-02-21

## 2023-02-20 RX ORDER — POLYETHYLENE GLYCOL 3350 17 G/17G
17 POWDER, FOR SOLUTION ORAL DAILY PRN
Status: DISCONTINUED | OUTPATIENT
Start: 2023-02-20 | End: 2023-02-23 | Stop reason: HOSPADM

## 2023-02-20 RX ORDER — HYDROMORPHONE HCL IN WATER/PF 6 MG/30 ML
0.2 PATIENT CONTROLLED ANALGESIA SYRINGE INTRAVENOUS
Status: DISCONTINUED | OUTPATIENT
Start: 2023-02-20 | End: 2023-02-21

## 2023-02-20 RX ORDER — LIDOCAINE 40 MG/G
CREAM TOPICAL
Status: DISCONTINUED | OUTPATIENT
Start: 2023-02-20 | End: 2023-02-21

## 2023-02-20 RX ORDER — OXYCODONE HYDROCHLORIDE 5 MG/1
5 TABLET ORAL EVERY 4 HOURS PRN
Status: DISCONTINUED | OUTPATIENT
Start: 2023-02-20 | End: 2023-02-21

## 2023-02-20 RX ORDER — ONDANSETRON 2 MG/ML
4 INJECTION INTRAMUSCULAR; INTRAVENOUS EVERY 6 HOURS PRN
Status: DISCONTINUED | OUTPATIENT
Start: 2023-02-20 | End: 2023-02-21

## 2023-02-20 RX ORDER — AMOXICILLIN 250 MG
1 CAPSULE ORAL 2 TIMES DAILY
Status: DISCONTINUED | OUTPATIENT
Start: 2023-02-20 | End: 2023-02-21

## 2023-02-20 RX ORDER — PROCHLORPERAZINE MALEATE 5 MG
10 TABLET ORAL EVERY 6 HOURS PRN
Status: DISCONTINUED | OUTPATIENT
Start: 2023-02-20 | End: 2023-02-21

## 2023-02-20 RX ORDER — ACETAMINOPHEN 500 MG
1000 TABLET ORAL ONCE
Status: COMPLETED | OUTPATIENT
Start: 2023-02-20 | End: 2023-02-20

## 2023-02-20 RX ORDER — BISACODYL 5 MG
5 TABLET, DELAYED RELEASE (ENTERIC COATED) ORAL DAILY PRN
Status: DISCONTINUED | OUTPATIENT
Start: 2023-02-20 | End: 2023-02-23 | Stop reason: HOSPADM

## 2023-02-20 RX ORDER — AMOXICILLIN 250 MG
2 CAPSULE ORAL 2 TIMES DAILY
Status: DISCONTINUED | OUTPATIENT
Start: 2023-02-20 | End: 2023-02-21

## 2023-02-20 RX ORDER — LITHIUM CARBONATE 300 MG/1
900 TABLET, FILM COATED, EXTENDED RELEASE ORAL EVERY MORNING
COMMUNITY

## 2023-02-20 RX ORDER — BISACODYL 5 MG
10 TABLET, DELAYED RELEASE (ENTERIC COATED) ORAL DAILY PRN
Status: DISCONTINUED | OUTPATIENT
Start: 2023-02-20 | End: 2023-02-23 | Stop reason: HOSPADM

## 2023-02-20 RX ORDER — MORPHINE SULFATE 4 MG/ML
4 INJECTION, SOLUTION INTRAMUSCULAR; INTRAVENOUS ONCE
Status: COMPLETED | OUTPATIENT
Start: 2023-02-20 | End: 2023-02-20

## 2023-02-20 RX ORDER — MORPHINE SULFATE 4 MG/ML
8 INJECTION, SOLUTION INTRAMUSCULAR; INTRAVENOUS ONCE
Status: COMPLETED | OUTPATIENT
Start: 2023-02-20 | End: 2023-02-20

## 2023-02-20 RX ORDER — ONDANSETRON 4 MG/1
4 TABLET, ORALLY DISINTEGRATING ORAL EVERY 6 HOURS PRN
Status: DISCONTINUED | OUTPATIENT
Start: 2023-02-20 | End: 2023-02-21

## 2023-02-20 RX ORDER — ACETAMINOPHEN 325 MG/1
975 TABLET ORAL EVERY 8 HOURS
Status: DISCONTINUED | OUTPATIENT
Start: 2023-02-20 | End: 2023-02-21

## 2023-02-20 RX ORDER — PROCHLORPERAZINE 25 MG
25 SUPPOSITORY, RECTAL RECTAL EVERY 12 HOURS PRN
Status: DISCONTINUED | OUTPATIENT
Start: 2023-02-20 | End: 2023-02-23 | Stop reason: HOSPADM

## 2023-02-20 RX ORDER — LORATADINE 10 MG/1
10 TABLET ORAL DAILY
COMMUNITY

## 2023-02-20 RX ADMIN — ACETAMINOPHEN 975 MG: 325 TABLET, FILM COATED ORAL at 22:03

## 2023-02-20 RX ADMIN — OXYCODONE HYDROCHLORIDE 5 MG: 5 TABLET ORAL at 20:33

## 2023-02-20 RX ADMIN — HYDROMORPHONE HYDROCHLORIDE 0.4 MG: 0.2 INJECTION, SOLUTION INTRAMUSCULAR; INTRAVENOUS; SUBCUTANEOUS at 19:55

## 2023-02-20 RX ADMIN — MORPHINE SULFATE 8 MG: 4 INJECTION, SOLUTION INTRAMUSCULAR; INTRAVENOUS at 16:40

## 2023-02-20 RX ADMIN — ACETAMINOPHEN 1000 MG: 500 TABLET ORAL at 16:17

## 2023-02-20 RX ADMIN — HYDROMORPHONE HYDROCHLORIDE 0.4 MG: 0.2 INJECTION, SOLUTION INTRAMUSCULAR; INTRAVENOUS; SUBCUTANEOUS at 22:03

## 2023-02-20 RX ADMIN — SENNOSIDES AND DOCUSATE SODIUM 2 TABLET: 50; 8.6 TABLET ORAL at 22:03

## 2023-02-20 RX ADMIN — MORPHINE SULFATE 4 MG: 4 INJECTION, SOLUTION INTRAMUSCULAR; INTRAVENOUS at 16:17

## 2023-02-20 ASSESSMENT — ENCOUNTER SYMPTOMS
ARTHRALGIAS: 1
NUMBNESS: 0

## 2023-02-20 ASSESSMENT — ACTIVITIES OF DAILY LIVING (ADL)
ADLS_ACUITY_SCORE: 35

## 2023-02-20 NOTE — ED NOTES
"[facility]  ED Nurse Handoff Report    ED Chief complaint: Fall      ED Diagnosis:   Final diagnoses:   None       Code Status: not addressed at this time    Allergies:   Allergies   Allergen Reactions     Aleve [Naproxen Sodium]      Asa [Aspirin]      sensitive to ASA causes increased nasal drainage     Haldol [Haloperidol]      Ibuprofen      sensitive to Ibuprofen. It causes increased nasal drainage.       Patient Story: Pt comes in by EMS from a mechanical fall on ice while walking his dog today, striking L hip. Did not have LOC, did not hit head, not on blood thinners.   Focused Assessment:  Pt is A&Ox4, denies SOB, denies chest pain. Pain in L hip 9/10 even after EMS gave 150 total of fentanyl. Did improve after 3 doses of morphine, see MAR. Tenderness over L hip, possible deformity. Imaging showing L hip fx.    XR Pelvis w Hip Left 1 View   Final Result   IMPRESSION: Curvilinear band of sclerosis in the proximal left femoral neck worrisome for an acute impacted subcapital fracture. CT or MRI would be helpful in further evaluation. Moderate degenerative changes in both hips.      CT Hip Left w/o Contrast    (Results Pending)       Treatments and/or interventions provided: see MAR  Patient's response to treatments and/or interventions: improvement in pain     To be done/followed up on inpatient unit:  cont with admitting Md orders    Does this patient have any cognitive concerns?: none    Activity level - Baseline/Home:  Independent  Activity Level - Current:   Stand with assist x2    Patient's Preferred language: English   Needed?: No    Isolation: None  Infection: Not Applicable  Patient tested for COVID 19 prior to admission: NO  Bariatric?: No    Vital Signs:   Vitals:    02/20/23 1520 02/20/23 1640   BP:  130/85   Pulse:  62   Resp:  13   Temp: 98.5  F (36.9  C)    TempSrc: Oral    SpO2:  97%   Weight: 124.7 kg (275 lb)    Height: 1.956 m (6' 5\")        Cardiac Rhythm:     Was " the PSS-3 completed:   Yes  What interventions are required if any?               Family Comments: wife at bedside      For the majority of the shift this patient's behavior was Green.   Behavioral interventions performed were none needed.    ED NURSE PHONE NUMBER: [phone]

## 2023-02-20 NOTE — ED TRIAGE NOTES
Pt comes in today for a mechanical fall on ice while walking dog, striking L hip. No LOC, did not hit head, not on blood thinners. Bradycardic in 50's, all other vitals stable. Pt is A&Ox4. Rating pain 8/10, very tender over L hip, possible deformity seen. Ems gave 150 mcg fentanyl total.      Triage Assessment     Row Name 02/20/23 1523       Triage Assessment (Adult)    Airway WDL WDL       Respiratory WDL    Respiratory WDL WDL       Skin Circulation/Temperature WDL    Skin Circulation/Temperature WDL WDL       Cardiac WDL    Cardiac WDL WDL       Peripheral/Neurovascular WDL    Peripheral Neurovascular WDL WDL       Cognitive/Neuro/Behavioral WDL    Cognitive/Neuro/Behavioral WDL WDL

## 2023-02-20 NOTE — H&P
"[facility]    History and Physical - Hospitalist Service       Date of Admission:  2/20/2023    Assessment & Plan      Garfield Kuhn is a 43 year old male admitted on 2/20/2023.     Acute displaced angulated fracture of the left femoral neck  -Inpatient  -Pain control  -Scheduled bowel regimen  - Orthopedic consult  - Consult PT/OT/SW    Mechanical fall  *Patient was walking dog on fresh snow and slipped on a patch of ice underneath.    Depression  - Continue PTA meds pending med rec         Diet:   regular  DVT Prophylaxis: Pneumatic Compression Devices  Slater Catheter: Not present  Lines: None     Cardiac Monitoring: None  Code Status:   FULL CODE    Clinically Significant Risk Factors Present on Admission                       # Obesity: Estimated body mass index is 32.61 kg/m  as calculated from the following:    Height as of this encounter: 1.956 m (6' 5\").    Weight as of this encounter: 124.7 kg (275 lb).           Disposition Plan      Expected Discharge Date: 02/22/2023                  Berlin Chu MD  Hospitalist Service  [facility]  Securely message with [employer] (more info)  Text page via [employer] Paging/Directory     ______________________________________________________________________    Chief Complaint   Fall, left hip pain    History is obtained from the patient, electronic health record, emergency department physician and patient's spouse    History of Present Illness   Garfield Kuhn is a 43 year old male who presents after mechanical fall.  He was walking his dog on fresh snow.  There was a patch of allies underneath.  He slipped on the patch of ice and struck his left hip on the ground.  No other site of trauma.  Did not hit his head his head, no loss of consciousness.  No other preceding symptoms      Past Medical History    No past medical history on file.    Past Surgical History   No past surgical history on file.    Prior to Admission " Medications   Prior to Admission Medications   Prescriptions Last Dose Informant Patient Reported? Taking?   EFFEXOR  MG OR CP24   Yes No   Si CAPSULE DAILY WITH FOOD   FLONASE INHA 50 MCG/DOSE NA   Yes No   Si SPRAYS IN EACH NOSTRIL QD (Once per day)   LEXAPRO 20 MG OR TABS   Yes No   Si TABLET DAILY   LORAZEPAM 0.5 MG OR TABS   Yes No   Si TABLET TWICE DAILY      Facility-Administered Medications: None           Physical Exam   Vital Signs: Temp: 98.5  F (36.9  C) Temp src: Oral BP: 136/82 Pulse: 62   Resp: 16 SpO2: 94 % O2 Device: None (Room air)    Weight: 275 lbs 0 oz    Constitutional: Awake, alert, cooperative, no apparent distress  Respiratory: Clear to auscultation bilaterally, no crackles or wheezing  Cardiovascular: Regular rate and rhythm, normal S1 and S2, and no murmur noted  GI: Normal bowel sounds, soft, non-distended, non-tender  Skin/Integumen: No rashes, no cyanosis, no edema  Other: Neurovascularly intact distally bilateral feet      Medical Decision Making       55 MINUTES SPENT BY ME on the date of service doing chart review, history, exam, documentation & further activities per the note.      Data     I have personally reviewed the following data over the past 24 hrs:    11.4 (H)  \   12.5 (L)   / 194     136 102 16.0 /  114 (H)   3.7 26 1.02 \       Imaging results reviewed over the past 24 hrs:   Recent Results (from the past 24 hour(s))   XR Pelvis w Hip Left 1 View    Narrative    EXAM: XR PELVIS AND HIP LEFT 1 VIEW  LOCATION: [facility]  DATE/TIME: 2023 4:58 PM    INDICATION: fall, pain  COMPARISON: None.      Impression    IMPRESSION: Curvilinear band of sclerosis in the proximal left femoral neck worrisome for an acute impacted subcapital fracture. CT or MRI would be helpful in further evaluation. Moderate degenerative changes in both hips.   CT Hip Left w/o Contrast    Narrative    EXAM: CT HIP LEFT W/O CONTRAST  LOCATION: [facility]  DATE/TIME: 2/20/2023 6:11 PM    INDICATION: Injury, pain. Suspected femoral neck fracture.  COMPARISON: None.  TECHNIQUE: Noncontrast. Axial, sagittal and coronal thin-section reconstruction. Dose reduction techniques were used.     FINDINGS:   Acute to the fracture of the left femoral neck extending to the subcapital region. There is anterior displacement of the distal fragment by 1 cm and impaction posteriorly resulting in apex anterior angulation.    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 Mild degenerative arthritis of the SI joints.    No fluid collection or hematoma.      Impression    IMPRESSION:  1.  Acute displaced and angulated fracture of the left femoral neck near and involving the subcapital region.

## 2023-02-20 NOTE — ED NOTES
Bed: ED14  Expected date: 2/20/23  Expected time: 2:55 PM  Means of arrival: Ambulance  Comments:  [place]1  hip  eta 1500

## 2023-02-20 NOTE — ED PROVIDER NOTES
"  History     Chief Complaint:  Fall    The history is provided by the patient.      Garfield Kuhn is a healthy 43 year old male who presents following a fall. He slipped on the ice and fall while walking his dog, landing on his left hip. He immediately had left hip pain and inability to weight bear. He did not hit his head or sustain loss of consciousness. He denies any other injuries or complaints including no numbness or tingling. He rates his pain as 8/10 after 150 mcg fentanyl from EMS.    Independent Historian: as above.     Review of External Notes: No relevant notes to review.     ROS:  Review of Systems   Constitutional: Negative for activity change.   Musculoskeletal: Positive for arthralgias and gait problem.   Skin: Negative for wound.   Neurological: Negative for syncope, numbness and headaches.   All other systems reviewed and are negative.    Allergies:  Naproxen Sodium  Aspirin  Haloperidol  Ibuprofen     Medications:    Lithium carbonate   Prozac   Not anticoagulated    Past Medical History:    Obesity  Anxiety  Benign positional vertigo  Vitamin D deficiency  Depression  Tonsillar enlargement  Temporomandibular joint disorder   Atrial fibrillation     Past Surgical History:    Elbow arthroscopy x2  Osteotomy craniotomy      Family History:    Mother: Depression     Social History:  Presents to the ED alone.  Enjoys bicycling.      Physical Exam     Patient Vitals for the past 24 hrs:   BP Temp Temp src Pulse Resp SpO2 Height Weight   02/20/23 1640 130/85 -- -- 62 13 97 % -- --   02/20/23 1520 -- 98.5  F (36.9  C) Oral -- -- -- 1.956 m (6' 5\") 124.7 kg (275 lb)      Physical Exam  General: Well-developed and well-nourished. Well appearing middle aged  man. Cooperative.  Head:  Atraumatic.  Eyes:  Conjunctivae, lids, and sclerae are normal.  Neck:  Supple. Normal range of motion.  CV:  2+ left DP.  Bradycardic.  Resp:  No respiratory distress.  GI:  Non-distended.   MS:  No bilateral lower " extremity edema.  Decreased range of motion of the left hip due to pain.  Mild tenderness laterally.  No tenderness throughout the remainder of this extremity.  Skin:  Warm. Non-diaphoretic. No pallor.  Neuro:  Awake. A&Ox3. Normal strength and sensation throughout the left lower extremity, wiggles toes.  Psych: Normal mood and affect. Normal speech.  Vitals reviewed.    Emergency Department Course     Imaging:  XR Pelvis w Hip Left 1 View   Final Result   IMPRESSION: Curvilinear band of sclerosis in the proximal left femoral neck worrisome for an acute impacted subcapital fracture. CT or MRI would be helpful in further evaluation. Moderate degenerative changes in both hips.      CT Hip Left w/o Contrast    (Results Pending)      Report per radiology    Emergency Department Course & Assessments:  Interventions:  Medications   morphine (PF) injection 4 mg (4 mg Intravenous Given 2/20/23 1617)   acetaminophen (TYLENOL) tablet 1,000 mg (1,000 mg Oral Given 2/20/23 1617)   morphine (PF) injection 8 mg (8 mg Intravenous Given 2/20/23 1640)      Independent Interpretation (X-rays, CTs, rhythm strip):  XR Pelvis with Hip Left     Consultations/Discussion of Management or Tests:  1724 I spoke with Dr. Reilly, orthopedics.  1739 I spoke with Kimberley, hospitalist, who agreed to accept the patient.     Social Determinants of Health affecting care:   Supportive friends, family member      Assessments:  1550 I obtained history and examined the patient as noted above.   1716 I updated the patient on findings and plan.     Disposition:  The patient was admitted to the hospital under the care of Dr. Chu.     Impression & Plan    Medical Decision Making:  Garfield is a 43 year old man who had a slip and fall on the ice while walking his dog.  He landed on his left hip and immediately had pain here.  He did not hit his head or sustain any other injuries.  He appears well on exam with tenderness over the left lateral hip without  deformity.  He is neurovascularly intact.  He required 12 mg of morphine and Tylenol for adequate pain control.    X-ray of the left hip reveals a band of sclerosis in the proximal left femoral neck worrisome for an impacted subcapital fracture.  Clinically he certainly has a hip fracture so I spoke with Dr. Reilly, orthopedics.  He has no further orders with plan for operative intervention no earlier than tomorrow.  I will send Garfield for a CT to further characterize this fracture.  Basic labs have also been sent pre-operatively.  I spoke with Dr. Chu, hospitalist, who accepts admission and has no further orders.  I updated the patient on findings and plan and answered all his questions.  He verbalized understanding and is amenable.    Diagnosis:    ICD-10-CM    1. Closed subcapital fracture of left femur, initial encounter (H)  S72.012A       2. Fall due to slipping on ice or snow, initial encounter  W00.9XXA              Scribe Disclosure:  I, Darwin Sheffield, am serving as a scribe at 3:42 PM on 2/20/2023 to document services personally performed by Florida Shaw MD based on my observations and the provider's statements to me.   2/20/2023   Florida Shaw MD Dixson, Kylie S, MD  02/21/23 0908

## 2023-02-21 ENCOUNTER — ANESTHESIA EVENT (OUTPATIENT)
Dept: SURGERY | Facility: CLINIC | Age: 43
DRG: 522 | End: 2023-02-21
Payer: COMMERCIAL

## 2023-02-21 ENCOUNTER — APPOINTMENT (OUTPATIENT)
Dept: GENERAL RADIOLOGY | Facility: CLINIC | Age: 43
DRG: 522 | End: 2023-02-21
Attending: ORTHOPAEDIC SURGERY
Payer: COMMERCIAL

## 2023-02-21 ENCOUNTER — ANESTHESIA (OUTPATIENT)
Dept: SURGERY | Facility: CLINIC | Age: 43
DRG: 522 | End: 2023-02-21
Payer: COMMERCIAL

## 2023-02-21 LAB
ABO/RH(D): NORMAL
ANTIBODY SCREEN: NEGATIVE
SPECIMEN EXPIRATION DATE: NORMAL

## 2023-02-21 PROCEDURE — C1713 ANCHOR/SCREW BN/BN,TIS/BN: HCPCS | Performed by: ORTHOPAEDIC SURGERY

## 2023-02-21 PROCEDURE — P9045 ALBUMIN (HUMAN), 5%, 250 ML: HCPCS | Performed by: NURSE ANESTHETIST, CERTIFIED REGISTERED

## 2023-02-21 PROCEDURE — 250N000011 HC RX IP 250 OP 636: Performed by: ORTHOPAEDIC SURGERY

## 2023-02-21 PROCEDURE — 250N000011 HC RX IP 250 OP 636: Performed by: NURSE ANESTHETIST, CERTIFIED REGISTERED

## 2023-02-21 PROCEDURE — 258N000003 HC RX IP 258 OP 636: Performed by: ANESTHESIOLOGY

## 2023-02-21 PROCEDURE — 258N000003 HC RX IP 258 OP 636: Performed by: HOSPITALIST

## 2023-02-21 PROCEDURE — 250N000011 HC RX IP 250 OP 636: Performed by: STUDENT IN AN ORGANIZED HEALTH CARE EDUCATION/TRAINING PROGRAM

## 2023-02-21 PROCEDURE — 0SRB03Z REPLACEMENT OF LEFT HIP JOINT WITH CERAMIC SYNTHETIC SUBSTITUTE, OPEN APPROACH: ICD-10-PCS | Performed by: ORTHOPAEDIC SURGERY

## 2023-02-21 PROCEDURE — 999N000141 HC STATISTIC PRE-PROCEDURE NURSING ASSESSMENT: Performed by: ORTHOPAEDIC SURGERY

## 2023-02-21 PROCEDURE — C1776 JOINT DEVICE (IMPLANTABLE): HCPCS | Performed by: ORTHOPAEDIC SURGERY

## 2023-02-21 PROCEDURE — 258N000003 HC RX IP 258 OP 636: Performed by: NURSE ANESTHETIST, CERTIFIED REGISTERED

## 2023-02-21 PROCEDURE — 258N000001 HC RX 258: Performed by: ORTHOPAEDIC SURGERY

## 2023-02-21 PROCEDURE — 86901 BLOOD TYPING SEROLOGIC RH(D): CPT | Performed by: PHYSICIAN ASSISTANT

## 2023-02-21 PROCEDURE — 250N000011 HC RX IP 250 OP 636: Performed by: PHYSICIAN ASSISTANT

## 2023-02-21 PROCEDURE — 250N000009 HC RX 250: Performed by: PHYSICIAN ASSISTANT

## 2023-02-21 PROCEDURE — 250N000025 HC SEVOFLURANE, PER MIN: Performed by: ORTHOPAEDIC SURGERY

## 2023-02-21 PROCEDURE — 36415 COLL VENOUS BLD VENIPUNCTURE: CPT | Performed by: PHYSICIAN ASSISTANT

## 2023-02-21 PROCEDURE — 250N000013 HC RX MED GY IP 250 OP 250 PS 637: Performed by: ORTHOPAEDIC SURGERY

## 2023-02-21 PROCEDURE — 250N000009 HC RX 250: Performed by: ORTHOPAEDIC SURGERY

## 2023-02-21 PROCEDURE — 710N000009 HC RECOVERY PHASE 1, LEVEL 1, PER MIN: Performed by: ORTHOPAEDIC SURGERY

## 2023-02-21 PROCEDURE — 250N000009 HC RX 250: Performed by: NURSE ANESTHETIST, CERTIFIED REGISTERED

## 2023-02-21 PROCEDURE — 999N000063 XR PELVIS AND HIP PORTABLE LEFT 1 VIEW

## 2023-02-21 PROCEDURE — 250N000013 HC RX MED GY IP 250 OP 250 PS 637: Performed by: PHYSICIAN ASSISTANT

## 2023-02-21 PROCEDURE — 370N000017 HC ANESTHESIA TECHNICAL FEE, PER MIN: Performed by: ORTHOPAEDIC SURGERY

## 2023-02-21 PROCEDURE — 99232 SBSQ HOSP IP/OBS MODERATE 35: CPT | Performed by: HOSPITALIST

## 2023-02-21 PROCEDURE — 250N000013 HC RX MED GY IP 250 OP 250 PS 637: Performed by: HOSPITALIST

## 2023-02-21 PROCEDURE — 272N000001 HC OR GENERAL SUPPLY STERILE: Performed by: ORTHOPAEDIC SURGERY

## 2023-02-21 PROCEDURE — 250N000013 HC RX MED GY IP 250 OP 250 PS 637: Performed by: STUDENT IN AN ORGANIZED HEALTH CARE EDUCATION/TRAINING PROGRAM

## 2023-02-21 PROCEDURE — 250N000009 HC RX 250: Performed by: ANESTHESIOLOGY

## 2023-02-21 PROCEDURE — 250N000011 HC RX IP 250 OP 636: Performed by: ANESTHESIOLOGY

## 2023-02-21 PROCEDURE — 120N000001 HC R&B MED SURG/OB

## 2023-02-21 PROCEDURE — 360N000077 HC SURGERY LEVEL 4, PER MIN: Performed by: ORTHOPAEDIC SURGERY

## 2023-02-21 PROCEDURE — 258N000003 HC RX IP 258 OP 636: Performed by: ORTHOPAEDIC SURGERY

## 2023-02-21 DEVICE — PINNACLE HIP SOLUTIONS ALTRX POLYETHYLENE ACETABULAR LINER NEUTRAL 36MM ID 64MM OD
Type: IMPLANTABLE DEVICE | Site: HIP | Status: FUNCTIONAL
Brand: PINNACLE ALTRX

## 2023-02-21 DEVICE — TRI-LOCK BPS FEMORAL STEM 12/14 TAPER TRI-LOCK BPS W/GRIPTION SIZE 10 STD 115MM
Type: IMPLANTABLE DEVICE | Site: HIP | Status: FUNCTIONAL
Brand: TRI-LOCK GRIPTION

## 2023-02-21 DEVICE — BIOLOX DELTA CERAMIC FEMORAL HEAD +5.0 36MM DIA 12/14 TAPER
Type: IMPLANTABLE DEVICE | Site: HIP | Status: FUNCTIONAL
Brand: BIOLOX DELTA

## 2023-02-21 DEVICE — PINNACLE CANCELLOUS BONE SCREW 6.5MM X 30MM
Type: IMPLANTABLE DEVICE | Site: HIP | Status: FUNCTIONAL
Brand: PINNACLE

## 2023-02-21 DEVICE — PINNACLE POROCOAT ACETABULAR SHELL SECTOR II 64MM OD
Type: IMPLANTABLE DEVICE | Site: HIP | Status: FUNCTIONAL
Brand: PINNACLE POROCOAT

## 2023-02-21 RX ORDER — HYDROMORPHONE HCL IN WATER/PF 6 MG/30 ML
0.4 PATIENT CONTROLLED ANALGESIA SYRINGE INTRAVENOUS
Status: DISCONTINUED | OUTPATIENT
Start: 2023-02-21 | End: 2023-02-23 | Stop reason: HOSPADM

## 2023-02-21 RX ORDER — CEFAZOLIN SODIUM/WATER 3 G/30 ML
3 SYRINGE (ML) INTRAVENOUS
Status: COMPLETED | OUTPATIENT
Start: 2023-02-21 | End: 2023-02-21

## 2023-02-21 RX ORDER — POLYETHYLENE GLYCOL 3350 17 G/17G
17 POWDER, FOR SOLUTION ORAL DAILY
Status: DISCONTINUED | OUTPATIENT
Start: 2023-02-22 | End: 2023-02-23 | Stop reason: HOSPADM

## 2023-02-21 RX ORDER — FENTANYL CITRATE 50 UG/ML
INJECTION, SOLUTION INTRAMUSCULAR; INTRAVENOUS PRN
Status: DISCONTINUED | OUTPATIENT
Start: 2023-02-21 | End: 2023-02-21

## 2023-02-21 RX ORDER — NALOXONE HYDROCHLORIDE 0.4 MG/ML
0.4 INJECTION, SOLUTION INTRAMUSCULAR; INTRAVENOUS; SUBCUTANEOUS
Status: DISCONTINUED | OUTPATIENT
Start: 2023-02-21 | End: 2023-02-23 | Stop reason: HOSPADM

## 2023-02-21 RX ORDER — SODIUM CHLORIDE 9 MG/ML
INJECTION, SOLUTION INTRAVENOUS CONTINUOUS
Status: DISCONTINUED | OUTPATIENT
Start: 2023-02-21 | End: 2023-02-22

## 2023-02-21 RX ORDER — FENTANYL CITRATE 0.05 MG/ML
25 INJECTION, SOLUTION INTRAMUSCULAR; INTRAVENOUS EVERY 5 MIN PRN
Status: DISCONTINUED | OUTPATIENT
Start: 2023-02-21 | End: 2023-02-21 | Stop reason: HOSPADM

## 2023-02-21 RX ORDER — OXYCODONE HYDROCHLORIDE 5 MG/1
5 TABLET ORAL EVERY 4 HOURS PRN
Status: DISCONTINUED | OUTPATIENT
Start: 2023-02-21 | End: 2023-02-23 | Stop reason: HOSPADM

## 2023-02-21 RX ORDER — LORATADINE 10 MG/1
10 TABLET ORAL DAILY
Status: DISCONTINUED | OUTPATIENT
Start: 2023-02-22 | End: 2023-02-23 | Stop reason: HOSPADM

## 2023-02-21 RX ORDER — SODIUM CHLORIDE, SODIUM LACTATE, POTASSIUM CHLORIDE, CALCIUM CHLORIDE 600; 310; 30; 20 MG/100ML; MG/100ML; MG/100ML; MG/100ML
INJECTION, SOLUTION INTRAVENOUS CONTINUOUS
Status: DISCONTINUED | OUTPATIENT
Start: 2023-02-21 | End: 2023-02-23 | Stop reason: HOSPADM

## 2023-02-21 RX ORDER — GLYCOPYRROLATE 0.2 MG/ML
INJECTION, SOLUTION INTRAMUSCULAR; INTRAVENOUS PRN
Status: DISCONTINUED | OUTPATIENT
Start: 2023-02-21 | End: 2023-02-21

## 2023-02-21 RX ORDER — OXYCODONE HYDROCHLORIDE 5 MG/1
10 TABLET ORAL EVERY 4 HOURS PRN
Status: DISCONTINUED | OUTPATIENT
Start: 2023-02-21 | End: 2023-02-23 | Stop reason: HOSPADM

## 2023-02-21 RX ORDER — CELECOXIB 100 MG/1
100 CAPSULE ORAL 2 TIMES DAILY
Status: COMPLETED | OUTPATIENT
Start: 2023-02-21 | End: 2023-02-22

## 2023-02-21 RX ORDER — CEFAZOLIN SODIUM/WATER 3 G/30 ML
3 SYRINGE (ML) INTRAVENOUS SEE ADMIN INSTRUCTIONS
Status: DISCONTINUED | OUTPATIENT
Start: 2023-02-21 | End: 2023-02-21 | Stop reason: HOSPADM

## 2023-02-21 RX ORDER — AMOXICILLIN 250 MG
1 CAPSULE ORAL 2 TIMES DAILY
Status: DISCONTINUED | OUTPATIENT
Start: 2023-02-21 | End: 2023-02-23 | Stop reason: HOSPADM

## 2023-02-21 RX ORDER — OXYCODONE HYDROCHLORIDE 5 MG/1
5 TABLET ORAL EVERY 4 HOURS PRN
Status: DISCONTINUED | OUTPATIENT
Start: 2023-02-21 | End: 2023-02-21

## 2023-02-21 RX ORDER — HYDROMORPHONE HCL IN WATER/PF 6 MG/30 ML
0.4 PATIENT CONTROLLED ANALGESIA SYRINGE INTRAVENOUS EVERY 5 MIN PRN
Status: DISCONTINUED | OUTPATIENT
Start: 2023-02-21 | End: 2023-02-21 | Stop reason: HOSPADM

## 2023-02-21 RX ORDER — LITHIUM CARBONATE 450 MG
900 TABLET, EXTENDED RELEASE ORAL EVERY MORNING
Status: DISCONTINUED | OUTPATIENT
Start: 2023-02-21 | End: 2023-02-23 | Stop reason: HOSPADM

## 2023-02-21 RX ORDER — TRANEXAMIC ACID 10 MG/ML
1 INJECTION, SOLUTION INTRAVENOUS ONCE
Status: COMPLETED | OUTPATIENT
Start: 2023-02-21 | End: 2023-02-21

## 2023-02-21 RX ORDER — BUPIVACAINE HYDROCHLORIDE AND EPINEPHRINE 2.5; 5 MG/ML; UG/ML
INJECTION, SOLUTION INFILTRATION; PERINEURAL PRN
Status: DISCONTINUED | OUTPATIENT
Start: 2023-02-21 | End: 2023-02-21 | Stop reason: HOSPADM

## 2023-02-21 RX ORDER — FENTANYL CITRATE 50 UG/ML
50 INJECTION, SOLUTION INTRAMUSCULAR; INTRAVENOUS ONCE
Status: COMPLETED | OUTPATIENT
Start: 2023-02-21 | End: 2023-02-21

## 2023-02-21 RX ORDER — OXYCODONE HYDROCHLORIDE 5 MG/1
10 TABLET ORAL EVERY 4 HOURS PRN
Status: DISCONTINUED | OUTPATIENT
Start: 2023-02-21 | End: 2023-02-21

## 2023-02-21 RX ORDER — NALOXONE HYDROCHLORIDE 0.4 MG/ML
0.2 INJECTION, SOLUTION INTRAMUSCULAR; INTRAVENOUS; SUBCUTANEOUS
Status: DISCONTINUED | OUTPATIENT
Start: 2023-02-21 | End: 2023-02-23 | Stop reason: HOSPADM

## 2023-02-21 RX ORDER — BISACODYL 10 MG
10 SUPPOSITORY, RECTAL RECTAL DAILY PRN
Status: DISCONTINUED | OUTPATIENT
Start: 2023-02-21 | End: 2023-02-23 | Stop reason: HOSPADM

## 2023-02-21 RX ORDER — SODIUM CHLORIDE, SODIUM LACTATE, POTASSIUM CHLORIDE, CALCIUM CHLORIDE 600; 310; 30; 20 MG/100ML; MG/100ML; MG/100ML; MG/100ML
INJECTION, SOLUTION INTRAVENOUS CONTINUOUS
Status: DISCONTINUED | OUTPATIENT
Start: 2023-02-21 | End: 2023-02-21 | Stop reason: HOSPADM

## 2023-02-21 RX ORDER — METHOCARBAMOL 500 MG/1
500 TABLET, FILM COATED ORAL 4 TIMES DAILY PRN
Status: DISCONTINUED | OUTPATIENT
Start: 2023-02-21 | End: 2023-02-23 | Stop reason: HOSPADM

## 2023-02-21 RX ORDER — ONDANSETRON 2 MG/ML
4 INJECTION INTRAMUSCULAR; INTRAVENOUS EVERY 6 HOURS PRN
Status: DISCONTINUED | OUTPATIENT
Start: 2023-02-21 | End: 2023-02-23 | Stop reason: HOSPADM

## 2023-02-21 RX ORDER — DEXAMETHASONE SODIUM PHOSPHATE 4 MG/ML
INJECTION, SOLUTION INTRA-ARTICULAR; INTRALESIONAL; INTRAMUSCULAR; INTRAVENOUS; SOFT TISSUE PRN
Status: DISCONTINUED | OUTPATIENT
Start: 2023-02-21 | End: 2023-02-21

## 2023-02-21 RX ORDER — HYDROMORPHONE HCL IN WATER/PF 6 MG/30 ML
0.2 PATIENT CONTROLLED ANALGESIA SYRINGE INTRAVENOUS EVERY 5 MIN PRN
Status: DISCONTINUED | OUTPATIENT
Start: 2023-02-21 | End: 2023-02-21 | Stop reason: HOSPADM

## 2023-02-21 RX ORDER — FENTANYL CITRATE 0.05 MG/ML
50 INJECTION, SOLUTION INTRAMUSCULAR; INTRAVENOUS
Status: DISCONTINUED | OUTPATIENT
Start: 2023-02-21 | End: 2023-02-21 | Stop reason: HOSPADM

## 2023-02-21 RX ORDER — CEFAZOLIN SODIUM 2 G/100ML
2 INJECTION, SOLUTION INTRAVENOUS EVERY 8 HOURS
Status: COMPLETED | OUTPATIENT
Start: 2023-02-22 | End: 2023-02-22

## 2023-02-21 RX ORDER — HYDROMORPHONE HYDROCHLORIDE 1 MG/ML
INJECTION, SOLUTION INTRAMUSCULAR; INTRAVENOUS; SUBCUTANEOUS PRN
Status: DISCONTINUED | OUTPATIENT
Start: 2023-02-21 | End: 2023-02-21

## 2023-02-21 RX ORDER — ACETAMINOPHEN 325 MG/1
975 TABLET ORAL EVERY 8 HOURS
Status: DISCONTINUED | OUTPATIENT
Start: 2023-02-21 | End: 2023-02-23 | Stop reason: HOSPADM

## 2023-02-21 RX ORDER — HYDROMORPHONE HCL IN WATER/PF 6 MG/30 ML
0.2 PATIENT CONTROLLED ANALGESIA SYRINGE INTRAVENOUS
Status: DISCONTINUED | OUTPATIENT
Start: 2023-02-21 | End: 2023-02-23 | Stop reason: HOSPADM

## 2023-02-21 RX ORDER — PROPOFOL 10 MG/ML
INJECTION, EMULSION INTRAVENOUS PRN
Status: DISCONTINUED | OUTPATIENT
Start: 2023-02-21 | End: 2023-02-21

## 2023-02-21 RX ORDER — PROPOFOL 10 MG/ML
INJECTION, EMULSION INTRAVENOUS CONTINUOUS PRN
Status: DISCONTINUED | OUTPATIENT
Start: 2023-02-21 | End: 2023-02-21

## 2023-02-21 RX ORDER — VANCOMYCIN HYDROCHLORIDE 1 G/20ML
INJECTION, POWDER, LYOPHILIZED, FOR SOLUTION INTRAVENOUS PRN
Status: DISCONTINUED | OUTPATIENT
Start: 2023-02-21 | End: 2023-02-21 | Stop reason: HOSPADM

## 2023-02-21 RX ORDER — ONDANSETRON 2 MG/ML
INJECTION INTRAMUSCULAR; INTRAVENOUS PRN
Status: DISCONTINUED | OUTPATIENT
Start: 2023-02-21 | End: 2023-02-21

## 2023-02-21 RX ORDER — PROCHLORPERAZINE MALEATE 5 MG
10 TABLET ORAL EVERY 6 HOURS PRN
Status: DISCONTINUED | OUTPATIENT
Start: 2023-02-21 | End: 2023-02-23 | Stop reason: HOSPADM

## 2023-02-21 RX ORDER — ONDANSETRON 4 MG/1
4 TABLET, ORALLY DISINTEGRATING ORAL EVERY 30 MIN PRN
Status: DISCONTINUED | OUTPATIENT
Start: 2023-02-21 | End: 2023-02-21 | Stop reason: HOSPADM

## 2023-02-21 RX ORDER — FENTANYL CITRATE 0.05 MG/ML
50 INJECTION, SOLUTION INTRAMUSCULAR; INTRAVENOUS EVERY 5 MIN PRN
Status: DISCONTINUED | OUTPATIENT
Start: 2023-02-21 | End: 2023-02-21 | Stop reason: HOSPADM

## 2023-02-21 RX ORDER — LIDOCAINE 40 MG/G
CREAM TOPICAL
Status: DISCONTINUED | OUTPATIENT
Start: 2023-02-21 | End: 2023-02-23 | Stop reason: HOSPADM

## 2023-02-21 RX ORDER — ONDANSETRON 4 MG/1
4 TABLET, ORALLY DISINTEGRATING ORAL EVERY 6 HOURS PRN
Status: DISCONTINUED | OUTPATIENT
Start: 2023-02-21 | End: 2023-02-23 | Stop reason: HOSPADM

## 2023-02-21 RX ORDER — NEOSTIGMINE METHYLSULFATE 1 MG/ML
VIAL (ML) INJECTION PRN
Status: DISCONTINUED | OUTPATIENT
Start: 2023-02-21 | End: 2023-02-21

## 2023-02-21 RX ORDER — ONDANSETRON 2 MG/ML
4 INJECTION INTRAMUSCULAR; INTRAVENOUS EVERY 30 MIN PRN
Status: DISCONTINUED | OUTPATIENT
Start: 2023-02-21 | End: 2023-02-21 | Stop reason: HOSPADM

## 2023-02-21 RX ORDER — MAGNESIUM HYDROXIDE 1200 MG/15ML
LIQUID ORAL PRN
Status: DISCONTINUED | OUTPATIENT
Start: 2023-02-21 | End: 2023-02-21 | Stop reason: HOSPADM

## 2023-02-21 RX ORDER — LIDOCAINE HYDROCHLORIDE 20 MG/ML
INJECTION, SOLUTION INFILTRATION; PERINEURAL PRN
Status: DISCONTINUED | OUTPATIENT
Start: 2023-02-21 | End: 2023-02-21

## 2023-02-21 RX ORDER — ACETAMINOPHEN 325 MG/1
650 TABLET ORAL EVERY 4 HOURS PRN
Status: DISCONTINUED | OUTPATIENT
Start: 2023-02-24 | End: 2023-02-23 | Stop reason: HOSPADM

## 2023-02-21 RX ADMIN — SODIUM CHLORIDE, POTASSIUM CHLORIDE, SODIUM LACTATE AND CALCIUM CHLORIDE: 600; 310; 30; 20 INJECTION, SOLUTION INTRAVENOUS at 19:11

## 2023-02-21 RX ADMIN — SODIUM CHLORIDE: 9 INJECTION, SOLUTION INTRAVENOUS at 11:01

## 2023-02-21 RX ADMIN — OXYCODONE HYDROCHLORIDE 10 MG: 5 TABLET ORAL at 17:12

## 2023-02-21 RX ADMIN — PHENYLEPHRINE HYDROCHLORIDE 100 MCG: 10 INJECTION INTRAVENOUS at 19:51

## 2023-02-21 RX ADMIN — Medication 40 ML: at 18:38

## 2023-02-21 RX ADMIN — GLYCOPYRROLATE 1 MG: 0.2 INJECTION, SOLUTION INTRAMUSCULAR; INTRAVENOUS at 21:02

## 2023-02-21 RX ADMIN — GLYCOPYRROLATE 0.2 MG: 0.2 INJECTION, SOLUTION INTRAMUSCULAR; INTRAVENOUS at 20:34

## 2023-02-21 RX ADMIN — METHOCARBAMOL 500 MG: 500 TABLET ORAL at 08:51

## 2023-02-21 RX ADMIN — CELECOXIB 100 MG: 100 CAPSULE ORAL at 23:31

## 2023-02-21 RX ADMIN — SODIUM CHLORIDE, POTASSIUM CHLORIDE, SODIUM LACTATE AND CALCIUM CHLORIDE: 600; 310; 30; 20 INJECTION, SOLUTION INTRAVENOUS at 23:35

## 2023-02-21 RX ADMIN — ONDANSETRON 4 MG: 2 INJECTION INTRAMUSCULAR; INTRAVENOUS at 21:02

## 2023-02-21 RX ADMIN — FENTANYL CITRATE 50 MCG: 50 INJECTION, SOLUTION INTRAMUSCULAR; INTRAVENOUS at 18:19

## 2023-02-21 RX ADMIN — OXYCODONE HYDROCHLORIDE 10 MG: 5 TABLET ORAL at 11:58

## 2023-02-21 RX ADMIN — FLUOXETINE 60 MG: 20 CAPSULE ORAL at 14:27

## 2023-02-21 RX ADMIN — HYDROMORPHONE HYDROCHLORIDE 0.4 MG: 0.2 INJECTION, SOLUTION INTRAMUSCULAR; INTRAVENOUS; SUBCUTANEOUS at 04:45

## 2023-02-21 RX ADMIN — ROCURONIUM BROMIDE 50 MG: 50 INJECTION, SOLUTION INTRAVENOUS at 19:33

## 2023-02-21 RX ADMIN — HYDROMORPHONE HYDROCHLORIDE 0.4 MG: 0.2 INJECTION, SOLUTION INTRAMUSCULAR; INTRAVENOUS; SUBCUTANEOUS at 14:21

## 2023-02-21 RX ADMIN — TRANEXAMIC ACID 1 G: 10 INJECTION, SOLUTION INTRAVENOUS at 19:21

## 2023-02-21 RX ADMIN — PROPOFOL 200 MG: 10 INJECTION, EMULSION INTRAVENOUS at 19:15

## 2023-02-21 RX ADMIN — LIDOCAINE HYDROCHLORIDE 80 MG: 20 INJECTION, SOLUTION INFILTRATION; PERINEURAL at 19:15

## 2023-02-21 RX ADMIN — PHENYLEPHRINE HYDROCHLORIDE 100 MCG: 10 INJECTION INTRAVENOUS at 19:54

## 2023-02-21 RX ADMIN — ROCURONIUM BROMIDE 50 MG: 50 INJECTION, SOLUTION INTRAVENOUS at 19:15

## 2023-02-21 RX ADMIN — DEXAMETHASONE SODIUM PHOSPHATE 10 MG: 4 INJECTION, SOLUTION INTRA-ARTICULAR; INTRALESIONAL; INTRAMUSCULAR; INTRAVENOUS; SOFT TISSUE at 19:34

## 2023-02-21 RX ADMIN — SENNOSIDES AND DOCUSATE SODIUM 2 TABLET: 50; 8.6 TABLET ORAL at 07:31

## 2023-02-21 RX ADMIN — HYDROMORPHONE HYDROCHLORIDE 0.4 MG: 0.2 INJECTION, SOLUTION INTRAMUSCULAR; INTRAVENOUS; SUBCUTANEOUS at 16:22

## 2023-02-21 RX ADMIN — HYDROMORPHONE HYDROCHLORIDE 0.4 MG: 0.2 INJECTION, SOLUTION INTRAMUSCULAR; INTRAVENOUS; SUBCUTANEOUS at 06:56

## 2023-02-21 RX ADMIN — HYDROMORPHONE HYDROCHLORIDE 0.5 MG: 1 INJECTION, SOLUTION INTRAMUSCULAR; INTRAVENOUS; SUBCUTANEOUS at 19:11

## 2023-02-21 RX ADMIN — ALBUMIN HUMAN: 0.05 INJECTION, SOLUTION INTRAVENOUS at 20:34

## 2023-02-21 RX ADMIN — PHENYLEPHRINE HYDROCHLORIDE 100 MCG: 10 INJECTION INTRAVENOUS at 19:33

## 2023-02-21 RX ADMIN — SODIUM CHLORIDE, POTASSIUM CHLORIDE, SODIUM LACTATE AND CALCIUM CHLORIDE: 600; 310; 30; 20 INJECTION, SOLUTION INTRAVENOUS at 18:45

## 2023-02-21 RX ADMIN — Medication 3 G: at 19:12

## 2023-02-21 RX ADMIN — SENNOSIDES AND DOCUSATE SODIUM 1 TABLET: 50; 8.6 TABLET ORAL at 23:32

## 2023-02-21 RX ADMIN — SODIUM CHLORIDE, POTASSIUM CHLORIDE, SODIUM LACTATE AND CALCIUM CHLORIDE: 600; 310; 30; 20 INJECTION, SOLUTION INTRAVENOUS at 20:07

## 2023-02-21 RX ADMIN — ACETAMINOPHEN 975 MG: 325 TABLET, FILM COATED ORAL at 14:22

## 2023-02-21 RX ADMIN — DEXMEDETOMIDINE HYDROCHLORIDE 12 MCG: 100 INJECTION, SOLUTION INTRAVENOUS at 21:14

## 2023-02-21 RX ADMIN — ACETAMINOPHEN 975 MG: 325 TABLET, FILM COATED ORAL at 06:57

## 2023-02-21 RX ADMIN — PHENYLEPHRINE HYDROCHLORIDE 0.2 MCG/KG/MIN: 10 INJECTION INTRAVENOUS at 19:59

## 2023-02-21 RX ADMIN — OXYCODONE HYDROCHLORIDE 5 MG: 5 TABLET ORAL at 07:31

## 2023-02-21 RX ADMIN — HYDROMORPHONE HYDROCHLORIDE 0.4 MG: 0.2 INJECTION, SOLUTION INTRAMUSCULAR; INTRAVENOUS; SUBCUTANEOUS at 11:02

## 2023-02-21 RX ADMIN — FENTANYL CITRATE 50 MCG: 50 INJECTION, SOLUTION INTRAMUSCULAR; INTRAVENOUS at 18:30

## 2023-02-21 RX ADMIN — TRANEXAMIC ACID 1 G: 10 INJECTION, SOLUTION INTRAVENOUS at 20:22

## 2023-02-21 RX ADMIN — HYDROMORPHONE HYDROCHLORIDE 0.4 MG: 0.2 INJECTION, SOLUTION INTRAMUSCULAR; INTRAVENOUS; SUBCUTANEOUS at 00:07

## 2023-02-21 RX ADMIN — DEXMEDETOMIDINE HYDROCHLORIDE 8 MCG: 100 INJECTION, SOLUTION INTRAVENOUS at 21:26

## 2023-02-21 RX ADMIN — SUGAMMADEX 200 MG: 100 INJECTION, SOLUTION INTRAVENOUS at 21:13

## 2023-02-21 RX ADMIN — NEOSTIGMINE METHYLSULFATE 5 MG: 1 INJECTION, SOLUTION INTRAVENOUS at 21:02

## 2023-02-21 RX ADMIN — PHENYLEPHRINE HYDROCHLORIDE 100 MCG: 10 INJECTION INTRAVENOUS at 20:13

## 2023-02-21 RX ADMIN — ACETAMINOPHEN 975 MG: 325 TABLET, FILM COATED ORAL at 23:32

## 2023-02-21 RX ADMIN — MIDAZOLAM 2 MG: 1 INJECTION INTRAMUSCULAR; INTRAVENOUS at 18:35

## 2023-02-21 RX ADMIN — HYDROMORPHONE HYDROCHLORIDE 0.4 MG: 0.2 INJECTION, SOLUTION INTRAMUSCULAR; INTRAVENOUS; SUBCUTANEOUS at 02:38

## 2023-02-21 RX ADMIN — PROPOFOL 30 MCG/KG/MIN: 10 INJECTION, EMULSION INTRAVENOUS at 19:39

## 2023-02-21 RX ADMIN — FENTANYL CITRATE 50 MCG: 50 INJECTION, SOLUTION INTRAMUSCULAR; INTRAVENOUS at 20:32

## 2023-02-21 RX ADMIN — LITHIUM CARBONATE 900 MG: 450 TABLET, EXTENDED RELEASE ORAL at 12:53

## 2023-02-21 RX ADMIN — HYDROMORPHONE HYDROCHLORIDE 0.4 MG: 0.2 INJECTION, SOLUTION INTRAMUSCULAR; INTRAVENOUS; SUBCUTANEOUS at 08:51

## 2023-02-21 RX ADMIN — ROCURONIUM BROMIDE 20 MG: 50 INJECTION, SOLUTION INTRAVENOUS at 19:59

## 2023-02-21 RX ADMIN — OXYCODONE HYDROCHLORIDE 5 MG: 5 TABLET ORAL at 03:29

## 2023-02-21 RX ADMIN — METHOCARBAMOL 500 MG: 500 TABLET ORAL at 14:27

## 2023-02-21 RX ADMIN — MIDAZOLAM 2 MG: 1 INJECTION INTRAMUSCULAR; INTRAVENOUS at 19:11

## 2023-02-21 ASSESSMENT — ACTIVITIES OF DAILY LIVING (ADL)
ADLS_ACUITY_SCORE: 35

## 2023-02-21 ASSESSMENT — ENCOUNTER SYMPTOMS
HEADACHES: 0
ACTIVITY CHANGE: 0
WOUND: 0

## 2023-02-21 NOTE — TREATMENT PLAN
Left femoral neck fracture    Patient will be scheduled for a Left hip BRIJESH later today pending patient is optimized for surgery per hospitalist and OR availability     Surgeon: Dr Reilly  NPO Status effective now   NWB/Bedrest until postop  Hold anticoagulants if taken: none PTA   Pain medication as needed

## 2023-02-21 NOTE — PROGRESS NOTES
"[facility]    Hospitalist Progress Note    Date of Admission:  2/20/2023    Assessment & Plan     Garfield Kuhn is a 43 year old male with h/o a fib, depression, anxiety, obesity admitted on 2/20/2023.      Acute displaced angulated fracture of the left femoral neck  Mechanical fall  Slipped and fell on ice while walking his dog. No head injury, LOC. CT pelvis showed : Acute displaced and angulated fracture of the left femoral neck near and involving the subcapital region.  -Inpatient  -Pain control: scheduled tylenol, PRN, dilaudid, Robaxin, oxycodone  -Scheduled bowel regimen  -Orthopedic consult. Patient is medically optimized for surgery today.  - Consult PT/OT/SW post op.     Depression  - Continue PTA lithium and fluoxetine.     Paroxysmal atrial fibrillation  Patient notes that he had 1 episode of A-fib more than 20 years ago that resolved with medication.  He has had no issues since then. He is not on any cardiac meds.  -Noted    Diet: NPO  DVT Prophylaxis: Pneumatic Compression Devices  Slater Catheter: Not present  Lines: None     Cardiac Monitoring: None  Code Status:   FULL CODE        Clinically Significant Risk Factors Present on Admission                          # Obesity: Estimated body mass index is 32.61 kg/m  as calculated from the following:    Height as of this encounter: 1.956 m (6' 5\").    Weight as of this encounter: 124.7 kg (275 lb).                  Disposition Plan        Expected Discharge Date: 02/23/2023                   Medical Decision Making               Clinically Significant Risk Factors Present on Admission                       # Obesity: Estimated body mass index is 32.61 kg/m  as calculated from the following:    Height as of this encounter: 1.956 m (6' 5\").    Weight as of this encounter: 124.7 kg (275 lb).             Katherine Mendoza MD  Text Page (7am - 6pm, M-F)  [facility]  Securely message with the [employer] Web Console " (RENAL)RETROPERITONEA COMP



HISTORY: Nephrocalcinosis Z87.442 History of kidney [id number] Ureteral stone

latex all



COMPARISON:  6/8/2017



FINDINGS:



Right kidney: Improved from the prior exam. No evidence for hydronephrosis.

Maximum linear dimension 9.9 cm. 4 mm lower pole nonobstructing calcification.



 Normal corticomedullary differentiation and cortical thickness.



Left kidney:  Maximum dimension 10.3 cm. No evidence for hydronephrosis. Several

peripelvic cysts. 6 mm nonobstructing upper pole calcification Normal

corticomedullary differentiation and cortical thickness.



Bladder: No bladder wall thickening. The bilateral ureteral jets were

identified.



IMPRESSION:  

Improved from the prior CT study. No significant hydronephrosis at this time.

Bilateral renal nonobstructing calcifications. Several renal left peripelvic

cysts unchanged from the prior exam.. 









The above report was generated using voice recognition software.  It may contain

grammatical, syntax or spelling errors.







Electronically signed by:  Ashutosh Fisher M.D.

9/25/2017 2:13 PM



Dictated Date/Time:  9/25/2017 2:08 PM (learn more here)  Text page via [facility] Paging/Directory      Interval History   Stable overnight.  Patient was still boarding in the ED when seen.  Notes that his pain is under better control this morning after he was moved to a hospital bed.  He was in a lot of pain last night when he was still on the gurney.  Denies pain anywhere else.  No chest pain or shortness of breath.  Awaiting surgery later today.    -Data reviewed today: I reviewed all new labs and imaging results over the last 24 hours. I personally reviewed CT scan with result as noted above    Physical Exam   Temp: 98.5  F (36.9  C) Temp src: Oral BP: (!) 148/103 Pulse: 71   Resp: 16 SpO2: 94 % O2 Device: None (Room air)    Vitals:    02/20/23 1520   Weight: 124.7 kg (275 lb)     Vital Signs with Ranges  Temp:  [98.5  F (36.9  C)] 98.5  F (36.9  C)  Pulse:  [51-75] 71  Resp:  [13-16] 16  BP: (130-156)/() 148/103  SpO2:  [88 %-97 %] 94 %  I/O last 3 completed shifts:  In: 120 [P.O.:120]  Out: 300 [Urine:300]    Constitutional: Alert, appears comfortable, in no acute distress  Respiratory: Non labored breathing, clear to auscultation bilaterally, no crackles or wheezes  Cardiovascular: Heart sounds regular rate and rhythm, no murmurs, no leg edema  GI: Abdomen is soft, non distended, non tender. Normal BS  Skin/Integumen: no rashes, no pressure sores  Neuro: alert, converses appropriatelyfluent speech, no facial asymmetry  Psych: mood and affect appropriate      Medications       acetaminophen  975 mg Oral Q8H     senna-docusate  1 tablet Oral BID    Or     senna-docusate  2 tablet Oral BID     sodium chloride (PF)  3 mL Intracatheter Q8H       Data   Recent Labs   Lab 02/20/23  1744   WBC 11.4*   HGB 12.5*   MCV 88         POTASSIUM 3.7   CHLORIDE 102   CO2 26   BUN 16.0   CR 1.02   ANIONGAP 8   MITCHEL 9.0   *       Imaging  Recent Results (from the past 24 hour(s))   XR Pelvis w Hip Left 1 View    Narrative    EXAM: XR PELVIS AND  HIP LEFT 1 VIEW  LOCATION: [facility] [place]  DATE/TIME: 2/20/2023 4:58 PM    INDICATION: fall, pain  COMPARISON: None.      Impression    IMPRESSION: Curvilinear band of sclerosis in the proximal left femoral neck worrisome for an acute impacted subcapital fracture. CT or MRI would be helpful in further evaluation. Moderate degenerative changes in both hips.   CT Hip Left w/o Contrast    Narrative    EXAM: CT HIP LEFT W/O CONTRAST  LOCATION: [facility] [place]  DATE/TIME: 2/20/2023 6:11 PM    INDICATION: Injury, pain. Suspected femoral neck fracture.  COMPARISON: None.  TECHNIQUE: Noncontrast. Axial, sagittal and coronal thin-section reconstruction. Dose reduction techniques were used.     FINDINGS:   Acute to the fracture of the left femoral neck extending to the subcapital region. There is anterior displacement of the distal fragment by 1 cm and impaction posteriorly resulting in apex anterior angulation.    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 Mild degenerative arthritis of the SI joints.    No fluid collection or hematoma.      Impression    IMPRESSION:  1.  Acute displaced and angulated fracture of the left femoral neck near and involving the subcapital region.

## 2023-02-21 NOTE — PHARMACY-ADMISSION MEDICATION HISTORY
Pharmacy Medication History  Admission medication history interview status for the 2/20/2023  admission is complete. See EPIC admission navigator for prior to admission medications     Location of Interview: Patient room  Medication history sources: Patient and Surescripts    Significant changes made to the medication list:  1) Added fluoxetine & lithium  2) Removed Lexapro & Effexor    In the past week, patient estimated taking medication this percent of the time: greater than 90%      Medication reconciliation completed by provider prior to medication history? No    Time spent in this activity: 10 minutes    Prior to Admission medications    Medication Sig Last Dose Taking? Auth Provider Long Term End Date   FLUoxetine (PROZAC) 20 MG capsule Take 60 mg by mouth daily 2/20/2023 at am Yes Unknown, Entered By History Yes    lithium ER (LITHOBID) 300 MG CR tablet Take 900 mg by mouth every morning 2/20/2023 at am Yes Unknown, Entered By History Yes    loratadine (CLARITIN) 10 MG tablet Take 10 mg by mouth daily 2/20/2023 Yes Unknown, Entered By History         The information provided in this note is only as accurate as the sources available at the time of update(s)

## 2023-02-22 ENCOUNTER — APPOINTMENT (OUTPATIENT)
Dept: GENERAL RADIOLOGY | Facility: CLINIC | Age: 43
DRG: 522 | End: 2023-02-22
Attending: PHYSICIAN ASSISTANT
Payer: COMMERCIAL

## 2023-02-22 ENCOUNTER — APPOINTMENT (OUTPATIENT)
Dept: PHYSICAL THERAPY | Facility: CLINIC | Age: 43
DRG: 522 | End: 2023-02-22
Attending: STUDENT IN AN ORGANIZED HEALTH CARE EDUCATION/TRAINING PROGRAM
Payer: COMMERCIAL

## 2023-02-22 LAB
CREAT SERPL-MCNC: 0.92 MG/DL (ref 0.67–1.17)
GFR SERPL CREATININE-BSD FRML MDRD: >90 ML/MIN/1.73M2
GLUCOSE SERPL-MCNC: 125 MG/DL (ref 70–99)
HGB BLD-MCNC: 11.6 G/DL (ref 13.3–17.7)

## 2023-02-22 PROCEDURE — 97530 THERAPEUTIC ACTIVITIES: CPT | Mod: GP | Performed by: PHYSICAL THERAPIST

## 2023-02-22 PROCEDURE — 120N000001 HC R&B MED SURG/OB

## 2023-02-22 PROCEDURE — 250N000013 HC RX MED GY IP 250 OP 250 PS 637: Performed by: HOSPITALIST

## 2023-02-22 PROCEDURE — 82565 ASSAY OF CREATININE: CPT | Performed by: HOSPITALIST

## 2023-02-22 PROCEDURE — 97116 GAIT TRAINING THERAPY: CPT | Mod: GP | Performed by: PHYSICAL THERAPIST

## 2023-02-22 PROCEDURE — 82947 ASSAY GLUCOSE BLOOD QUANT: CPT | Performed by: STUDENT IN AN ORGANIZED HEALTH CARE EDUCATION/TRAINING PROGRAM

## 2023-02-22 PROCEDURE — 99232 SBSQ HOSP IP/OBS MODERATE 35: CPT | Performed by: HOSPITALIST

## 2023-02-22 PROCEDURE — 250N000011 HC RX IP 250 OP 636: Performed by: ORTHOPAEDIC SURGERY

## 2023-02-22 PROCEDURE — 85018 HEMOGLOBIN: CPT | Performed by: HOSPITALIST

## 2023-02-22 PROCEDURE — 97161 PT EVAL LOW COMPLEX 20 MIN: CPT | Mod: GP | Performed by: PHYSICAL THERAPIST

## 2023-02-22 PROCEDURE — 73502 X-RAY EXAM HIP UNI 2-3 VIEWS: CPT

## 2023-02-22 PROCEDURE — 250N000013 HC RX MED GY IP 250 OP 250 PS 637: Performed by: STUDENT IN AN ORGANIZED HEALTH CARE EDUCATION/TRAINING PROGRAM

## 2023-02-22 PROCEDURE — 250N000013 HC RX MED GY IP 250 OP 250 PS 637: Performed by: ORTHOPAEDIC SURGERY

## 2023-02-22 PROCEDURE — 36415 COLL VENOUS BLD VENIPUNCTURE: CPT | Performed by: HOSPITALIST

## 2023-02-22 PROCEDURE — 250N000013 HC RX MED GY IP 250 OP 250 PS 637: Performed by: PHYSICIAN ASSISTANT

## 2023-02-22 RX ORDER — ACETAMINOPHEN 325 MG/1
650 TABLET ORAL EVERY 4 HOURS PRN
Qty: 30 TABLET | Refills: 0 | Status: SHIPPED | OUTPATIENT
Start: 2023-02-24

## 2023-02-22 RX ORDER — ASPIRIN 325 MG
325 TABLET, DELAYED RELEASE (ENTERIC COATED) ORAL DAILY
Qty: 30 TABLET | Refills: 0 | Status: SHIPPED | OUTPATIENT
Start: 2023-02-23

## 2023-02-22 RX ORDER — AMOXICILLIN 250 MG
1 CAPSULE ORAL 2 TIMES DAILY PRN
Qty: 20 TABLET | Refills: 0 | Status: SHIPPED | OUTPATIENT
Start: 2023-02-22

## 2023-02-22 RX ORDER — OXYCODONE HYDROCHLORIDE 5 MG/1
5-10 TABLET ORAL EVERY 4 HOURS PRN
Qty: 25 TABLET | Refills: 0 | Status: SHIPPED | OUTPATIENT
Start: 2023-02-22

## 2023-02-22 RX ADMIN — ACETAMINOPHEN 975 MG: 325 TABLET, FILM COATED ORAL at 22:05

## 2023-02-22 RX ADMIN — FLUOXETINE 60 MG: 20 CAPSULE ORAL at 08:21

## 2023-02-22 RX ADMIN — POLYETHYLENE GLYCOL 3350 17 G: 17 POWDER, FOR SOLUTION ORAL at 08:24

## 2023-02-22 RX ADMIN — OXYCODONE HYDROCHLORIDE 5 MG: 5 TABLET ORAL at 13:55

## 2023-02-22 RX ADMIN — ASPIRIN 325 MG: 325 TABLET, COATED ORAL at 08:22

## 2023-02-22 RX ADMIN — SENNOSIDES AND DOCUSATE SODIUM 1 TABLET: 50; 8.6 TABLET ORAL at 20:28

## 2023-02-22 RX ADMIN — LITHIUM CARBONATE 900 MG: 450 TABLET, EXTENDED RELEASE ORAL at 08:24

## 2023-02-22 RX ADMIN — ACETAMINOPHEN 975 MG: 325 TABLET, FILM COATED ORAL at 05:27

## 2023-02-22 RX ADMIN — METHOCARBAMOL 500 MG: 500 TABLET ORAL at 14:14

## 2023-02-22 RX ADMIN — METHOCARBAMOL 500 MG: 500 TABLET ORAL at 08:22

## 2023-02-22 RX ADMIN — SENNOSIDES AND DOCUSATE SODIUM 1 TABLET: 50; 8.6 TABLET ORAL at 08:22

## 2023-02-22 RX ADMIN — CELECOXIB 100 MG: 100 CAPSULE ORAL at 20:28

## 2023-02-22 RX ADMIN — POLYETHYLENE GLYCOL 3350 17 G: 17 POWDER, FOR SOLUTION ORAL at 22:06

## 2023-02-22 RX ADMIN — CEFAZOLIN SODIUM 2 G: 2 INJECTION, SOLUTION INTRAVENOUS at 02:48

## 2023-02-22 RX ADMIN — OXYCODONE HYDROCHLORIDE 5 MG: 5 TABLET ORAL at 18:37

## 2023-02-22 RX ADMIN — CEFAZOLIN SODIUM 2 G: 2 INJECTION, SOLUTION INTRAVENOUS at 11:21

## 2023-02-22 RX ADMIN — ACETAMINOPHEN 975 MG: 325 TABLET, FILM COATED ORAL at 14:13

## 2023-02-22 RX ADMIN — LORATADINE 10 MG: 10 TABLET ORAL at 08:19

## 2023-02-22 RX ADMIN — OXYCODONE HYDROCHLORIDE 5 MG: 5 TABLET ORAL at 08:26

## 2023-02-22 RX ADMIN — POLYETHYLENE GLYCOL 3350 17 G: 17 POWDER, FOR SOLUTION ORAL at 08:22

## 2023-02-22 RX ADMIN — CELECOXIB 100 MG: 100 CAPSULE ORAL at 11:21

## 2023-02-22 ASSESSMENT — ACTIVITIES OF DAILY LIVING (ADL)
ADLS_ACUITY_SCORE: 35
ADLS_ACUITY_SCORE: 37
ADLS_ACUITY_SCORE: 35

## 2023-02-22 NOTE — PROGRESS NOTES
"   02/22/23 1000   Appointment Info   Signing Clinician's Name / Credentials (PT) Nicholas Bartlett DPT       Present no   Living Environment   People in Home spouse;child(génesis), dependent   Current Living Arrangements house   Home Accessibility stairs to enter home;stairs within home   Number of Stairs, Main Entrance 2   Stair Railings, Main Entrance none   Number of Stairs, Within Home, Primary ten   Stair Railings, Within Home, Primary railing on right side (ascending)   Transportation Anticipated family or friend will provide   Living Environment Comments Pt lives in a house with his spouse and children. Stairs within and to enter. Pt reports his spouse will pick him up upon discharge. Pt has assist if needed.   Self-Care   Usual Activity Tolerance good   Current Activity Tolerance good   Regular Exercise No   Equipment Currently Used at Home none   Fall history within last six months yes   Number of times patient has fallen within last six months 3   Activity/Exercise/Self-Care Comment Pt reports being IND at baseline with all ADLs. Pt ambulates w/o an AD at baseline and doesn't have a FWW. Pt works a desk job.   General Information   Onset of Illness/Injury or Date of Surgery 02/22/23   Referring Physician Pedro Espinal MD   Patient/Family Therapy Goals Statement (PT) \"To go home\"   Pertinent History of Current Problem (include personal factors and/or comorbidities that impact the POC) s/p L BRIJESH POD#1   Existing Precautions/Restrictions fall;no hip IR;no hip ADD past midline   Weight-Bearing Status - LLE weight-bearing as tolerated   Weight-Bearing Status - RLE full weight-bearing   Cognition   Orientation Status (Cognition) oriented x 3   Pain Assessment   Patient Currently in Pain Yes, see Vital Sign flowsheet  (1/10 in L hip)   Integumentary/Edema   Integumentary/Edema Comments Incision on L hip with dressing intact   Posture    Posture Forward head position;Protracted shoulders "   Range of Motion (ROM)   Range of Motion ROM is WFL   Strength (Manual Muscle Testing)   Strength (Manual Muscle Testing) Able to perform R SLR;Able to perform L SLR   Bed Mobility   Comment, (Bed Mobility) Did not assess   Transfers   Comment, (Transfers) Sit>stand w/ FWW and CGA   Gait/Stairs (Locomotion)   Faulk Level (Gait) contact guard   Assistive Device (Gait) walker, front-wheeled   Distance in Feet 10'   Distance in Feet (Gait) 150' x 2   Balance   Balance Comments Good static sitting balance; pt ambulated w/ a FWW and is steady   Sensory Examination   Sensory Perception patient reports no sensory changes   Clinical Impression   Criteria for Skilled Therapeutic Intervention Yes, treatment indicated   PT Diagnosis (PT) Impaired gait   Influenced by the following impairments Decreased activity tolerance; decreased balance; decreased strength   Functional limitations due to impairments Impaired functional mobility   Clinical Presentation (PT Evaluation Complexity) Stable/Uncomplicated   Clinical Presentation Rationale Clinical judgement   Clinical Decision Making (Complexity) low complexity   Planned Therapy Interventions (PT) balance training;bed mobility training;gait training;patient/family education;stair training;strengthening;transfer training   Anticipated Equipment Needs at Discharge (PT) walker, rolling   Risk & Benefits of therapy have been explained evaluation/treatment results reviewed;care plan/treatment goals reviewed;risks/benefits reviewed;current/potential barriers reviewed;participants voiced agreement with care plan;participants included;patient   PT Total Evaluation Time   PT Eval, Low Complexity Minutes (16642) 10   Plan of Care Review   Plan of Care Reviewed With patient   Physical Therapy Goals   PT Frequency Daily   PT Predicted Duration/Target Date for Goal Attainment 02/27/23   PT Goals Bed Mobility;Transfers;Gait;Stairs   PT: Bed Mobility Supervision/stand-by assist;Supine  to/from sit;Within precautions   PT: Transfers Supervision/stand-by assist;Sit to/from stand;Assistive device;Within precautions;Goal Met   PT: Gait Supervision/stand-by assist;Assistive device;Within precautions;150 feet   PT: Stairs Minimal assist;10 stairs;Within precautions;Rail on right   Interventions   Interventions Quick Adds Gait Training;Therapeutic Activity   Therapeutic Activity   Therapeutic Activities: dynamic activities to improve functional performance Minutes (36235) 13   Symptoms Noted During/After Treatment Fatigue   Treatment Detail/Skilled Intervention Greeted pt sitting in chair, agreed to PT. VSS on RA throughout session. PT educated pt on hip precautions and WBAT status, pt voiced understanding. Pt performed sit>stand x 4 w/ FWW and CGA, verbal cues for hand placement. After ambulation, pt returned to chair and performed stand>sit w/ FWW and CGA, verbal cues to descend in a slow, controlled motion. PT provided further education on walking program and importance of OOB activity, pt voiced understanding. Pt ended session sitting in chair, with all needs met and call light within reach.   Gait Training   Gait Training Minutes (04784) 27   Symptoms Noted During/After Treatment (Gait Training) fatigue   Treatment Detail/Skilled Intervention Pt ambulated w/ FWW and CGA. Pt ambulated with decreased gait speed, downward gaze, decreased step length, heavy reliance on FWW, and steady. Verbal cues for upright gaze and posture, to increase step length, to reduce reliance on FWW, and pacing. Pt required a seated rest break before stairs. Pt negotiated 8 stairs using R rail and CGA. PT provided visual demonstration for safe stair negotiation. Pt negotiated with a step-to pattern and was steady. No LOB noted and was able to abide by hip precautions.   PT Discharge Planning   PT Plan Assess bed mobility; gait w/ FWW; stairs (10 w/ R rail)   PT Discharge Recommendation (DC Rec) home with assist   PT Rationale  for DC Rec Pt is below baseline but is moving well. Pt currently requiring CGA for all functional mobility. Anticipate at time of discharge pt will be SBA for bed mobility, functional mobility, gait w/ FWW, and min A x 1 for stairs. Pt will have assist from spouse as needed. Recommend pt use a FWW at discharge.   PT Brief overview of current status Sit>stand w/ FWW and CGA; gait w/ FWW and CGA; stairs w/ CGA   Total Session Time   Timed Code Treatment Minutes 40   Total Session Time (sum of timed and untimed services) 50

## 2023-02-22 NOTE — ANESTHESIA PREPROCEDURE EVALUATION
Anesthesia Pre-Procedure Evaluation    Patient: Garfield Kuhn   MRN: [id number] : 1980        Procedure : Procedure(s):  LEFT TOTAL HIP ARTHROPLASTY          No past medical history on file.   No past surgical history on file.   Allergies   Allergen Reactions     Aleve [Naproxen Sodium]      Asa [Aspirin]      sensitive to ASA causes increased nasal drainage     Haldol [Haloperidol]      Ibuprofen      sensitive to Ibuprofen. It causes increased nasal drainage.      Social History     Tobacco Use     Smoking status: Never     Smokeless tobacco: Not on file   Substance Use Topics     Alcohol use: Yes      Wt Readings from Last 1 Encounters:   23 124.7 kg (275 lb)        Anesthesia Evaluation   Pt has had prior anesthetic.     No history of anesthetic complications       ROS/MED HX  ENT/Pulmonary:    (-) sleep apnea   Neurologic:       Cardiovascular:       METS/Exercise Tolerance:     Hematologic:       Musculoskeletal:       GI/Hepatic:    (-) GERD   Renal/Genitourinary:       Endo:     (+) Obesity (BMI 33),     Psychiatric/Substance Use:     (+) psychiatric history depression     Infectious Disease:       Malignancy:       Other:            Physical Exam    Airway        Mallampati: III   TM distance: > 3 FB   Neck ROM: full   Mouth opening: > 3 cm    Respiratory Devices and Support         Dental           Cardiovascular   cardiovascular exam normal          Pulmonary   pulmonary exam normal                OUTSIDE LABS:  CBC:   Lab Results   Component Value Date    WBC 11.4 (H) 2023    HGB 12.5 (L) 2023    HCT 38.4 (L) 2023     2023     BMP:   Lab Results   Component Value Date     2023    POTASSIUM 3.7 2023    CHLORIDE 102 2023    CO2 26 2023    BUN 16.0 2023    CR 1.02 2023     (H) 2023     COAGS: No results found for: PTT, INR, FIBR  POC: No results found for: BGM, HCG, HCGS  HEPATIC: No results found for:  ALBUMIN, PROTTOTAL, ALT, AST, GGT, ALKPHOS, BILITOTAL, BILIDIRECT, GEORGE  OTHER:   Lab Results   Component Value Date    MITCHEL 9.0 02/20/2023       Anesthesia Plan    ASA Status:  3   NPO Status:  NPO Appropriate    Anesthesia Type: General.     - Airway: ETT   Induction: Intravenous.   Maintenance: Balanced.   Techniques and Equipment:     - Airway: Video-Laryngoscope         Consents    Anesthesia Plan(s) and associated risks, benefits, and realistic alternatives discussed. Questions answered and patient/representative(s) expressed understanding.    - Discussed:     - Discussed with:  Patient         Postoperative Care    Pain management: IV analgesics, Multi-modal analgesia, Peripheral nerve block (Single Shot).   PONV prophylaxis: Ondansetron (or other 5HT-3), Dexamethasone or Solumedrol, Background Propofol Infusion     Comments:    Other Comments: H&P reviewed    Decadron 10mg  Toradol 30mg            Alex Martines MD

## 2023-02-22 NOTE — PROGRESS NOTES
Pt came up to unit from PACU around 2245; A&Ox4. VSS on 2L via NC. Pain managed with current regimen. LR running @ 100 mL/hr. Ambulated to BR; A of 1-2 GB/W. Had a fall this shift while in the BR *see previous note* Pt reported numbness to LLE from block. Tolerating PO well; SL. Aquacel dressing on L hip marked; no changes. Voided a few times this shift via urinal; writer performed bladder scan and noted 1074 mL. Straight cath x1; 1250 mL urine output. Discharge pending.

## 2023-02-22 NOTE — ANESTHESIA PROCEDURE NOTES
Fascia iliaca Procedure Note    Pre-Procedure   Staff -        Anesthesiologist:  Alex Martines MD       Performed By: Anesthesiologist       Location: pre-op       Pre-Anesthestic Checklist: patient identified, IV checked, risks and benefits discussed, informed consent, pre-op evaluation, at physician/surgeon's request and post-op pain management  Timeout:       Correct Patient: Yes        Correct Procedure: Yes        Correct Site: Yes        Correct Position: Yes        Correct Laterality: Yes        Site Marked: Yes  Procedure Documentation  Procedure: Fascia iliaca       Laterality: left       Patient Position: supine       Skin prep: Chloraprep       Local skin infiltrated with mL of 1% lidocaine.        Needle Type: short bevel       Needle Gauge: 20.        Needle Length (Inches): 3.13        Ultrasound guided       1. Ultrasound was used to identify targeted nerve, plexus, vascular marker, or fascial plane and place a needle adjacent to it in real-time.       2. Ultrasound was used to visualize the spread of anesthetic in close proximity to the above referenced structure.       3. A permanent image is entered into the patient's record.    Assessment/Narrative         The placement was negative for: blood aspirated, painful injection and site bleeding       Paresthesias: No.       Bolus given via needle..        Secured via.        Insertion/Infusion Method: Single Shot       Complications: none       Injection made incrementally with aspirations every 5 mL.    Medication(s) Administered   Ropivacaine 0.5% w/ 1:400K Epi (Injection) - Injection   40 mL - 2/21/2023 6:38:00 PM   Comments:  Ultrasound Interpretation, peripheral nerve block    1.  Under ultrasound guidance, needle was inserted and placed in close proximity to the nerve.  2. Ultrasound was also used to visualize the spread of the anesthetic in close proximity to the nerve being blocked.  3. The nerve appeared anatomically normal.  4. There  "were no apparent abnormal pathological findings.  5. A permanent ultrasound image was saved n the patient's record.    Diluted with 10cc 0.9% normal saline    Alex Martines MD   6:37 PM      FOR [facility] ([facility]) ONLY:   Pain Team Contact information: please page the Pain Team Via [employer]. Search \"Pain\". During daytime hours, please page the attending first. At night please page the resident first.    "

## 2023-02-22 NOTE — PROVIDER NOTIFICATION
Brief update:    Hospitalist consult received for hospitalist consult.    Patient followed by hospitalist service currently.     Have discontinued consult order. Please page if acute concern.    Rios Oliver MD  11:12 PM

## 2023-02-22 NOTE — PROVIDER NOTIFICATION
I did not see notes if ortho team was notified regarding patient fall last night. So, it is just RENALDO. MD. Notified via phone and left voicemail.

## 2023-02-22 NOTE — PLAN OF CARE
Patient A/ox4, VSS,RA. self administer IS.  CMS intact, incision CDI. Good intake and output. Sat on the chair, ambulated to the bathroom, in the hallway with AX1, gaitbelt and walker. Pain mange with oxy, robaxin and scheduled tylenol, ambulation and cold pack.   Reported of abdominal gas pain. NO BM-had been passing gas.

## 2023-02-22 NOTE — CODE/RAPID RESPONSE
"[facility]    Fall Note  2/22/2023   Time Called: 0057    Code Status: Full Code  Called for fall assessment    Assessment & Plan    Patient is a 43-year-old male admitted on 2/21/2023 after suffering a left femoral neck fracture and subsequent left total hip arthroplasty.    I was called to assess Garfield Kuhn following a fall. The patient reports prior to the fall he was using the bathroom and asked staff to step out of the room as he was \"gun shy\".  Patient stated that he was to put too much weight on his left hip that was still under a nerve block and he lost his balance and fell sideways.  Patient denies hitting his head, denied dizziness, chest pain, syncope, loss of consciousness.  Patient denied new pain afterwards and was able to stand with staff assistance and walked back to bed.    Patient was able to adduct/abduct all extremities, left lower extremity weaker than right lower extremity.  Patient endorsed numbness in his left lower extremity attributed likely to nerve block.  Patient had full range of motion of all extremities, C-spine.    No obvious injury on initial assessment. Patient assisted back to bed by staff.     I informed the patient that not all injuries are obvious at the time of injury. The patient declined any imaging (x-ray, CT scan) at this time.       Patient remains on ortho spine floor.     Discussed with and defer further cares to charge RN.      Allergies   Allergies   Allergen Reactions     Aleve [Naproxen Sodium]      Asa [Aspirin]      sensitive to ASA causes increased nasal drainage     Haldol [Haloperidol]      Ibuprofen      sensitive to Ibuprofen. It causes increased nasal drainage.       Physical Exam   Vital Signs with Ranges:  Temp:  [97.7  F (36.5  C)-99.5  F (37.5  C)] 97.7  F (36.5  C)  Pulse:  [51-87] 67  Resp:  [12-20] 13  BP: (124-150)/() 139/76  SpO2:  [91 %-97 %] 93 %  I/O last 3 completed shifts:  In: 1570 [P.O.:120; " I.V.:1200]  Out: 1600 [Urine:1100; Blood:500]    Data     ABG:  -No lab results found in last 7 days.    IMAGING: (X-ray/CT/MRI)   Recent Results (from the past 24 hour(s))   XR Pelvis w Hip Port Left  1 View    Narrative    EXAM: XR PELVIS AND HIP PORTABLE LEFT 1 VIEW  LOCATION: [facility]  DATE/TIME: 2/21/2023 10:05 PM    INDICATION: Status post left hip surgery.  COMPARISON: None.      Impression    IMPRESSION:     Immediate postsurgical changes from left total hip arthroplasty, in satisfactory alignment, without evidence of immediate hardware complication. Adjacent postsurgical soft tissue swelling and gas. Overlying surgical skin staples.    No evidence of acute fracture or dislocation.     Mild to moderate degenerative changes of the right hip.       CBC with Diff:  Recent Labs   Lab Test 02/20/23  1744   WBC 11.4*   HGB 12.5*   MCV 88           Lactic Acid:    No results found for: LACT        Comprehensive Metabolic Panel:  Recent Labs   Lab 02/20/23  1744      POTASSIUM 3.7   CHLORIDE 102   CO2 26   ANIONGAP 8   *   BUN 16.0   CR 1.02   GFRESTIMATED >90   MITCHEL 9.0       INR:    No lab results found.      BNP:  No results found for: BNP    UA:  No results for input(s): COLOR, APPEARANCE, URINEGLC, URINEBILI, URINEKETONE, SG, UBLD, URINEPH, PROTEIN, UROBILINOGEN, NITRITE, LEUKEST, RBCU, WBCU in the last 168 hours.    Time Spent on this Encounter   I spent 15 minutes on the unit/floor managing the care of Garfield Khun. Over 50% of my time was spent counseling the patient and/or coordinating care regarding services listed in this note.      SATNAM Koroma CNP   SATNAM De León, CNP  Hospitalist - [facility]  Text me on the [employer] carroll for a textback  Text page with web based paging for a callback

## 2023-02-22 NOTE — PROGRESS NOTES
"[facility]    Hospitalist Progress Note    Date of Admission:  2/20/2023    Assessment & Plan     Garfield Kuhn is a 43 year old male with h/o a fib, depression, anxiety, obesity admitted on 2/20/2023.      Acute displaced angulated fracture of the left femoral neck s/p left BRIJESH on 2/21/2023  Mechanical fall  Acute blood loss anemia, expected postop  Slipped and fell on ice while walking his dog. No head injury, LOC. CT pelvis showed : Acute displaced and angulated fracture of the left femoral neck near and involving the subcapital region.  -Inpatient  -Pain control: scheduled tylenol, PRN, dilaudid, Robaxin, oxycodone  -Scheduled bowel regimen  -Orthopedic consult. Patient underwent surgery as above.  Routine postoperative cares per orthopedics.  --Hb 11.6 on POD 1.  Monitor intermittently.  --Patient had a fall night of 2/21.  No obvious injuries.  Deferred need for repeat imaging to orthopedics.  - Consult PT/OT/SW post op.     Depression  - Continue PTA lithium and fluoxetine.     Paroxysmal atrial fibrillation  Patient notes that he had 1 episode of A-fib more than 20 years ago that resolved with medication.  He has had no issues since then. He is not on any cardiac meds.  -Noted    Diet:  Regular diet  DVT Prophylaxis: Pneumatic Compression Devices  Slater Catheter: Not present  Lines: None     Cardiac Monitoring: None  Code Status:   FULL CODE        Clinically Significant Risk Factors Present on Admission                          # Obesity: Estimated body mass index is 32.61 kg/m  as calculated from the following:    Height as of this encounter: 1.956 m (6' 5\").    Weight as of this encounter: 124.7 kg (275 lb).                  Disposition Plan        Expected Discharge Date: 02/23/2023                   Medical Decision Making               Clinically Significant Risk Factors                         # Obesity: Estimated body mass index is 32.61 kg/m  as calculated from the " "following:    Height as of this encounter: 1.956 m (6' 5\").    Weight as of this encounter: 124.7 kg (275 lb)., PRESENT ON ADMISSION           Katherine Mendoza MD  Text Page (7am - 6pm, M-F)  [facility]  Securely message with the Vocera Web Console (learn more here)  Text page via [employer] Paging/Directory      Interval History   Stable overnight.  Pain is at acceptable level.  No BM yet.  Worked with PT and did well.  Anticipating home tomorrow.    -Data reviewed today: I reviewed all new labs and imaging results over the last 24 hours. I personally reviewed CT scan with result as noted above    Physical Exam   Temp: 97.8  F (36.6  C) Temp src: Oral BP: 118/75 Pulse: 67   Resp: 16 SpO2: 98 % O2 Device: None (Room air) Oxygen Delivery: 3 LPM  Vitals:    02/20/23 1520   Weight: 124.7 kg (275 lb)     Vital Signs with Ranges  Temp:  [97.7  F (36.5  C)-99.5  F (37.5  C)] 97.8  F (36.6  C)  Pulse:  [63-87] 67  Resp:  [12-20] 16  BP: (118-148)/(75-93) 118/75  SpO2:  [91 %-98 %] 98 %  I/O last 3 completed shifts:  In: 1450 [I.V.:1200]  Out: 3520 [Urine:3020; Blood:500]    Constitutional: Alert, appears comfortable, in no acute distress  Respiratory: Non labored breathing, clear to auscultation bilaterally  Cardiovascular: Heart sounds regular rate and rhythm, no murmurs  GI: Abdomen is soft, non distended, non tender.  Hypoactive BS  Neuro: alert, converses appropriatelyfluent speech, no facial asymmetry  Psych: mood and affect appropriate      Medications     lactated ringers 100 mL/hr at 02/21/23 2335     sodium chloride 75 mL/hr at 02/21/23 1655       acetaminophen  975 mg Oral Q8H     aspirin  325 mg Oral Daily     ceFAZolin  2 g Intravenous Q8H     celecoxib  100 mg Oral BID     FLUoxetine  60 mg Oral Daily     lithium ER  900 mg Oral QAM     loratadine  10 mg Oral Daily     polyethylene glycol  17 g Oral Daily     senna-docusate  1 tablet Oral BID     sodium chloride (PF)  3 mL Intracatheter Q8H "     sodium chloride (PF)  3 mL Intracatheter Q8H       Data   Recent Labs   Lab 02/22/23  0721 02/20/23  1744   WBC  --  11.4*   HGB 11.6* 12.5*   MCV  --  88   PLT  --  194   NA  --  136   POTASSIUM  --  3.7   CHLORIDE  --  102   CO2  --  26   BUN  --  16.0   CR 0.92 1.02   ANIONGAP  --  8   MITCHEL  --  9.0   * 114*       Imaging  Recent Results (from the past 24 hour(s))   XR Pelvis w Hip Port Left  1 View    Narrative    EXAM: XR PELVIS AND HIP PORTABLE LEFT 1 VIEW  LOCATION: [facility]  DATE/TIME: 2/21/2023 10:05 PM    INDICATION: Status post left hip surgery.  COMPARISON: None.      Impression    IMPRESSION:     Immediate postsurgical changes from left total hip arthroplasty, in satisfactory alignment, without evidence of immediate hardware complication. Adjacent postsurgical soft tissue swelling and gas. Overlying surgical skin staples.    No evidence of acute fracture or dislocation.     Mild to moderate degenerative changes of the right hip.

## 2023-02-22 NOTE — ANESTHESIA POSTPROCEDURE EVALUATION
Patient: Garfield Kuhn    Procedure: Procedure(s):  LEFT TOTAL HIP ARTHROPLASTY       Anesthesia Type:  General    Note:  Disposition: Admission   Postop Pain Control: Uneventful            Sign Out: Well controlled pain   PONV: No   Neuro/Psych: Uneventful            Sign Out: Acceptable/Baseline neuro status   Airway/Respiratory: Uneventful            Sign Out: Acceptable/Baseline resp. status   CV/Hemodynamics: Uneventful            Sign Out: Acceptable CV status; No obvious hypovolemia; No obvious fluid overload   Other NRE: NONE   DID A NON-ROUTINE EVENT OCCUR? No           Last vitals:  Vitals Value Taken Time   /81 02/21/23 2220   Temp 37.5  C (99.5  F) 02/21/23 2220   Pulse 69 02/21/23 2223   Resp 18 02/21/23 2223   SpO2 93 % 02/21/23 2226   Vitals shown include unvalidated device data.    Electronically Signed By: Mel Fink MD  February 21, 2023  10:49 PM

## 2023-02-22 NOTE — PROGRESS NOTES
Orthopedic Surgery  Garfield Kuhn  02/22/2023     Admit Date:  2/20/2023  POD: 1 Day Post-Op   Procedure(s):  LEFT TOTAL HIP ARTHROPLASTY    Alert and oriented.   Patient resting comfortably in chair   Pain controlled.  Tolerating oral intake.    Denies nausea or vomiting  Denies chest pain or shortness of breath  Patient did sustain a fall last night while in the bathroom.  He was able to walk with PT today.      Temp:  [97.7  F (36.5  C)-99.5  F (37.5  C)] 97.8  F (36.6  C)  Pulse:  [67-87] 67  Resp:  [12-20] 16  BP: (118-148)/(75-90) 118/75  SpO2:  [91 %-98 %] 98 %    Dressing is clean, dry, and intact.   Minimal erythema of the surrounding skin.   Bilateral calves are soft, non-tender.  Left lower extremity is NVI.  Sensation intact bilateral lower extremities  Patient able to resist dorsi and plantar flexion bilaterally  +Dp pulse    Labs:  Recent Labs   Lab Test 02/22/23  0721 02/20/23  1744   WBC  --  11.4*   HGB 11.6* 12.5*   PLT  --  194       1. PLAN:   Will order AP Pelvis/Left lateral hip Xrays to confirm prosthetic fixation following his fall.    Continue ASA for DVT prophylaxis.     Mobilize with PT/OT    WBAT Left LE with gait aide.     Continue current pain regiment.   Dressings: Keep intact.  Change if >60% saturated or peeling off.    Follow-up: 2 weeks post-op with Dr Espinal team    2. Disposition   Anticipate d/c to home tomorrow if medically cleared and progressing in PT.    Jamilah Boyce PA-C

## 2023-02-22 NOTE — BRIEF OP NOTE
S/P BRIJESH for FNF  Teddy  EBL see dictated op note  No specimens  No anticipated complications

## 2023-02-22 NOTE — BRIEF OP NOTE
[facility]    Brief Operative Note    Pre-operative diagnosis: Closed displaced fracture of left femoral neck (H) [S72.002A]  Post-operative diagnosis Same as pre-operative diagnosis    Procedure: Procedure(s):  LEFT TOTAL HIP ARTHROPLASTY  Surgeon: Surgeon(s) and Role:     * Pedro Espinal MD - Primary  Anesthesia: Choice with Block   Estimated Blood Loss: 200 ml    Drains: None  Specimens:   ID Type Source Tests Collected by Time Destination   A : Left Femoral head  Bone Resection Femoral Head, Left OR DOCUMENTATION ONLY Pedro Espinal MD 2/21/2023  8:01 PM      Findings:   None.  Complications: None.  Implants:   Implant Name Type Inv. Item Serial No.  Lot No. LRB No. Used Action   IMP CUP ACE PINNACLE 64MM 1217-22064 - [id number] Total Joint Component/Insert IMP CUP ACE PINNACLE 64MM 1217-22064  [employer] [facility]- [id number] Left 1 Implanted   IMP SCR BONE CAN ACE 6.5X30MM 1217- - [id number] Metallic Hardware/[place] IMP SCR BONE CAN ACE 6.5X30MM 1217-  [employer]- [id number] Left 1 Implanted   LINER ACETABULAR NEUTRAL 36MM ID 64MM OD PINNACLE ALTRX - [id number] Total Joint Component/Insert LINER ACETABULAR NEUTRAL 36MM ID 64MM OD PINNACLE ALTRX  [employer]- [id number] Left 1 Implanted   IMP SCR BONE CAN ACE 6.5X30MM 1217- - [id number] Metallic Hardware/[place] IMP SCR BONE CAN ACE 6.5X30MM 1217-  [employer]- [id number] Left 1 Implanted   FEMORAL STEM 12/14 Taper      [id number] Left 1 Implanted   IMP HEAD FEMORAL DEPUY CERAMIC 36MM +5MM [id number] - [id number] Total Joint Component/Insert IMP HEAD FEMORAL DEPUY CERAMIC 36MM +5MM [id number]  [employer]- [id number] Left 1 Implanted

## 2023-02-22 NOTE — CONSULTS
Consult Date: 2023    CHIEF COMPLAINT:  Left hip injury.    HISTORY OF PRESENT ILLNESS: Garfield is a 43-year-old male with a history of moderate hip pain and low-grade arthritis who slipped and fell on the ice, sustaining an isolated left hip injury.  He is accompanied by his wife who is an OB/GYN physician.  He describes no other injuries or problems.  He is otherwise healthy.  He is on medication for depression.    PHYSICAL EXAMINATION:  He is able to flex and extend his foot.  He has pain with log roll.  Skin is intact about the lateral hip.    IMAGING:  X-rays show displaced femoral neck fracture with moderate preexisting osteoarthritis.      IMPRESSION:  1.  Displaced left femoral neck fracture.  2.  Osteoarthritis, left hip.  3.  Large stature.    RECOMMENDATIONS:  I reviewed his situation.  I recommend total hip arthroplasty.  I reviewed that this will be somewhat complicated by his femoral neck fracture and associated large body habitus of 6 feet 5 inches, 260 pounds.  I reviewed the risks and benefits, including leg length inequality, fracture and dislocation, among others.  All questions were answered.    Pedro Espinal MD        D: 2023   T: 2023   MT: KARELY    Name:     GARFIELD JEFFERY  MRN:      [id number]        Account:      [id number]   :      1980           Consult Date: 2023     Document: [id number]

## 2023-02-22 NOTE — OP NOTE
Procedure Date: 02/21/2023    PREOPERATIVE DIAGNOSES:   1.  Left femoral neck fracture.  2.  Large body habitus (6 feet 5 inches, 260 pounds).  3.  Osteoarthritis, left hip.    POSTOPERATIVE DIAGNOSES:  1.  Left femoral neck fracture.  2.  Large body habitus (6 feet 5 inches, 260 pounds).  3.  Osteoarthritis, left hip.    PROCEDURE PERFORMED:  Left total hip arthroplasty.    SURGEON:  Pedro Espinal MD    ASSISTANT:  None.    ANESTHESIA:  General.    ESTIMATED BLOOD LOSS:  500 mL.    COMPLICATIONS:  None.    DESCRIPTION OF PROCEDURE:  The patient was taken to the operating room where after administration of antibiotic prophylaxis, tranexamic acid, decubitus position and sterile prep and drape, a posterior approach was used, splitting the fascia and reflecting the short external rotators and capsule as a sleeve, leaving the piriformis attached.  The hip was dislocated and the fracture removed.  The neck cut was freshened.  Bony debris was removed.  Retractors were carefully placed about the acetabulum and the sciatic nerve was palpated and protected.  Sequential reaming to 63 mm was performed, followed by placement of a DePuy 64 mm cup with 2 screws and a standard liner.     Attention was directed to the femoral side where after sequential reaming and broaching, a tapered size 10 stem was placed with excellent torsional and axial stability.  After trialing, a +5 x 36 mm ceramic head was selected.  This appeared to restore leg lengths.  I accepted the possibility that he would be minimally long as he likely will have a matched arthroplasty on the contralateral side.  This did provide excellent stability with no ability to dislocate the hip anteriorly.  Posteriorly, the hip was stable to 85 degrees of internal rotation.  Lower neck lengths were substantially less stable.  Copious lavage was performed with Betadine, followed by 1 gram of vancomycin powder in the capsule.  A stout capsular closure was then accomplished,  followed by the remaining layered anatomic closure.  There were no complications.  Substantial additional time was taken due to his body size as noted above at 6 feet 5 inches, 260 pounds.  There were no complications.    Pedro Espinal MD        D: 2023   T: 2023   MT: KARELY    Name:     CUCO JEFFERY  MRN:      [id number]        Account:        [id number]   :      1980           Procedure Date: 2023     Document: [id number]

## 2023-02-22 NOTE — ANESTHESIA PROCEDURE NOTES
Airway       Patient location during procedure: OR       Procedure Start/Stop Times: 2/21/2023 7:17 PM  Staff -        Anesthesiologist:  Alex Martines MD       CRNA: Nancy Ashraf APRN CRNA       Other Anesthesia Staff: Madyson Blount       Performed By: SRNA  Consent for Airway        Urgency: elective  Indications and Patient Condition       Indications for airway management: maxx-procedural       Induction type:intravenous       Mask difficulty assessment: 2 - vent by mask + OA or adjuvant +/- NMBA    Final Airway Details       Final airway type: endotracheal airway       Successful airway: ETT - single and Oral  Endotracheal Airway Details        ETT size (mm): 8.0       Cuffed: yes       Successful intubation technique: video laryngoscopy       VL Blade Size: Matt 4       Grade View of Cords: 1       Adjucts: stylet       Position: Left       Measured from: gums/teeth       Secured at (cm): 24       Bite block used: None    Post intubation assessment        Placement verified by: capnometry, equal breath sounds and chest rise        Number of attempts at approach: 1       Number of other approaches attempted: 0       Secured with: pink tape       Ease of procedure: easy       Dentition: Intact and Unchanged    Medication(s) Administered   Medication Administration Time: 2/21/2023 7:17 PM

## 2023-02-22 NOTE — ANESTHESIA POSTPROCEDURE EVALUATION
Patient: Garfield Kuhn    Procedure: Procedure(s):  LEFT TOTAL HIP ARTHROPLASTY       Anesthesia Type:  General    Note:     Postop Pain Control: Uneventful            Sign Out: Well controlled pain   PONV: No   Neuro/Psych: Uneventful            Sign Out: Acceptable/Baseline neuro status   Airway/Respiratory: Uneventful            Sign Out: AIRWAY IN SITU/Resp. Support   CV/Hemodynamics: Uneventful            Sign Out: Acceptable CV status; No obvious hypovolemia; No obvious fluid overload   Other NRE: NONE   DID A NON-ROUTINE EVENT OCCUR? No           Last vitals:  Vitals:    02/21/23 1811 02/21/23 1838 02/21/23 1848   BP:  (!) 143/87 (!) 148/90   Pulse: 72 75 75   Resp:      Temp:      SpO2:  96% 97%       Electronically Signed By: Alex Martines MD  February 21, 2023  7:50 PM

## 2023-02-23 ENCOUNTER — APPOINTMENT (OUTPATIENT)
Dept: OCCUPATIONAL THERAPY | Facility: CLINIC | Age: 43
DRG: 522 | End: 2023-02-23
Attending: STUDENT IN AN ORGANIZED HEALTH CARE EDUCATION/TRAINING PROGRAM
Payer: COMMERCIAL

## 2023-02-23 ENCOUNTER — APPOINTMENT (OUTPATIENT)
Dept: PHYSICAL THERAPY | Facility: CLINIC | Age: 43
DRG: 522 | End: 2023-02-23
Payer: COMMERCIAL

## 2023-02-23 VITALS
HEART RATE: 72 BPM | BODY MASS INDEX: 32.47 KG/M2 | OXYGEN SATURATION: 98 % | DIASTOLIC BLOOD PRESSURE: 83 MMHG | HEIGHT: 77 IN | WEIGHT: 275 LBS | TEMPERATURE: 97.9 F | RESPIRATION RATE: 16 BRPM | SYSTOLIC BLOOD PRESSURE: 120 MMHG

## 2023-02-23 LAB
CREAT SERPL-MCNC: 0.88 MG/DL (ref 0.67–1.17)
GFR SERPL CREATININE-BSD FRML MDRD: >90 ML/MIN/1.73M2
GLUCOSE SERPL-MCNC: 105 MG/DL (ref 70–99)
HGB BLD-MCNC: 11.3 G/DL (ref 13.3–17.7)

## 2023-02-23 PROCEDURE — 82947 ASSAY GLUCOSE BLOOD QUANT: CPT | Performed by: STUDENT IN AN ORGANIZED HEALTH CARE EDUCATION/TRAINING PROGRAM

## 2023-02-23 PROCEDURE — 97165 OT EVAL LOW COMPLEX 30 MIN: CPT | Mod: GO | Performed by: OCCUPATIONAL THERAPIST

## 2023-02-23 PROCEDURE — 85018 HEMOGLOBIN: CPT | Performed by: ORTHOPAEDIC SURGERY

## 2023-02-23 PROCEDURE — 250N000013 HC RX MED GY IP 250 OP 250 PS 637: Performed by: ORTHOPAEDIC SURGERY

## 2023-02-23 PROCEDURE — 36415 COLL VENOUS BLD VENIPUNCTURE: CPT | Performed by: ORTHOPAEDIC SURGERY

## 2023-02-23 PROCEDURE — 97116 GAIT TRAINING THERAPY: CPT | Mod: GP

## 2023-02-23 PROCEDURE — 99232 SBSQ HOSP IP/OBS MODERATE 35: CPT | Performed by: HOSPITALIST

## 2023-02-23 PROCEDURE — 250N000013 HC RX MED GY IP 250 OP 250 PS 637: Performed by: HOSPITALIST

## 2023-02-23 PROCEDURE — 97535 SELF CARE MNGMENT TRAINING: CPT | Mod: GO | Performed by: OCCUPATIONAL THERAPIST

## 2023-02-23 PROCEDURE — 250N000013 HC RX MED GY IP 250 OP 250 PS 637: Performed by: PHYSICIAN ASSISTANT

## 2023-02-23 PROCEDURE — 97530 THERAPEUTIC ACTIVITIES: CPT | Mod: GP

## 2023-02-23 PROCEDURE — 82565 ASSAY OF CREATININE: CPT | Performed by: HOSPITALIST

## 2023-02-23 RX ADMIN — LITHIUM CARBONATE 900 MG: 450 TABLET, EXTENDED RELEASE ORAL at 08:10

## 2023-02-23 RX ADMIN — METHOCARBAMOL 500 MG: 500 TABLET ORAL at 08:10

## 2023-02-23 RX ADMIN — LORATADINE 10 MG: 10 TABLET ORAL at 08:10

## 2023-02-23 RX ADMIN — MAGNESIUM HYDROXIDE 30 ML: 400 SUSPENSION ORAL at 15:15

## 2023-02-23 RX ADMIN — POLYETHYLENE GLYCOL 3350 17 G: 17 POWDER, FOR SOLUTION ORAL at 08:09

## 2023-02-23 RX ADMIN — FLUOXETINE 60 MG: 20 CAPSULE ORAL at 08:10

## 2023-02-23 RX ADMIN — OXYCODONE HYDROCHLORIDE 5 MG: 5 TABLET ORAL at 15:15

## 2023-02-23 RX ADMIN — SENNOSIDES AND DOCUSATE SODIUM 1 TABLET: 50; 8.6 TABLET ORAL at 08:10

## 2023-02-23 RX ADMIN — OXYCODONE HYDROCHLORIDE 5 MG: 5 TABLET ORAL at 08:10

## 2023-02-23 RX ADMIN — ACETAMINOPHEN 975 MG: 325 TABLET, FILM COATED ORAL at 06:24

## 2023-02-23 RX ADMIN — ASPIRIN 325 MG: 325 TABLET, COATED ORAL at 08:10

## 2023-02-23 RX ADMIN — ACETAMINOPHEN 975 MG: 325 TABLET, FILM COATED ORAL at 15:15

## 2023-02-23 ASSESSMENT — ACTIVITIES OF DAILY LIVING (ADL)
ADLS_ACUITY_SCORE: 39
ADLS_ACUITY_SCORE: 37
ADLS_ACUITY_SCORE: 37
ADLS_ACUITY_SCORE: 39
ADLS_ACUITY_SCORE: 37
ADLS_ACUITY_SCORE: 40
ADLS_ACUITY_SCORE: 37

## 2023-02-23 NOTE — DISCHARGE SUMMARY
Discharge Summary  Hospitalist    Date of Admission:  2/20/2023  Date of Discharge:  2/23/2023  Discharging Provider: Katherine Mendoza MD, MD  Date of Service (when I saw the patient): 02/23/23    Discharge Diagnoses   Acute displaced angulated fracture of the left femoral neck s/p left BRIJESH on 2/21/2023  Mechanical fall  Acute blood loss anemia, expected postop    History of Present Illness   Please refer H & P for details.      Hospital Course   Garfield Kuhn is a 43 year old male with h/o a fib, depression, anxiety, obesity admitted on 2/20/2023.      Acute displaced angulated fracture of the left femoral neck s/p left BRIJESH on 2/21/2023  Mechanical fall  Acute blood loss anemia, expected postop  Slipped and fell on ice while walking his dog. No head injury, LOC. CT pelvis showed : Acute displaced and angulated fracture of the left femoral neck near and involving the subcapital region.  -Inpatient  -Pain control: scheduled tylenol, PRN dilaudid, Robaxin, oxycodone  -Scheduled bowel regimen  -Orthopedic consult. Patient underwent surgery as above.  Routine postoperative cares per orthopedics.  --Hb 11.3 on day of discharge  --Patient had a fall night of 2/21.  No obvious injuries.  Deferred need for repeat imaging to orthopedics.  Cleared for discharge per orthopedics.  - Consult PT/OT/SW post op.  Cleared for discharge by therapies.  -ASA for DVT prophylaxis per orthopedics.  Follow-up with orthopedics as arranged by them.     Depression  - Continue PTA lithium and fluoxetine.     Paroxysmal atrial fibrillation  Patient notes that he had 1 episode of A-fib more than 20 years ago that resolved with medication.  He has had no issues since then. He is not on any cardiac meds.  -Noted         Katherine Mendoza MD, MD      Pending Results   These results will be followed up by Hospitalist team.  Unresulted Labs Ordered in the Past 30 Days of this Admission     No orders found from 1/21/2023 to 2/21/2023.          Code  Status   Full Code       Primary Care Physician   Waqar Stokes MD    Follow-ups Needed After Discharge   Follow-up Appointments     Follow Up Care      Follow-up with Cee Ramirez PA-C with Dr Espinal in 10-14 days   [phone]         Follow-up and recommended labs and tests       Follow up with primary care provider, Waqar Stokes MD, within 7 days for   hospital follow- up.  No follow up labs or test are needed.             Physical Exam   Temp: 97.9  F (36.6  C) Temp src: Axillary BP: 120/83 Pulse: 72   Resp: 16 SpO2: 98 % O2 Device: None (Room air)    Vitals:    02/20/23 1520   Weight: 124.7 kg (275 lb)     Vital Signs with Ranges  Temp:  [97.9  F (36.6  C)-99  F (37.2  C)] 97.9  F (36.6  C)  Pulse:  [72-74] 72  Resp:  [16-18] 16  BP: (120-138)/(83-84) 120/83  SpO2:  [95 %-98 %] 98 %  I/O last 3 completed shifts:  In: 1200 [P.O.:1200]  Out: 3970 [Urine:3970]      Constitutional: Alert, appears comfortable, in no acute distress  Respiratory: Non labored breathing, clear to auscultation bilaterally  Cardiovascular: Heart sounds regular rate and rhythm, no murmurs  GI: Abdomen is soft, non distended, non tender.  Hypoactive BS  Neuro: alert, converses appropriatelyfluent speech, no facial asymmetry  Psych: mood and affect appropriate        Discharge Disposition   Discharged to home  Condition at discharge: Stable    Consultations This Hospital Stay   ORTHOPEDIC SURGERY IP CONSULT  PHYSICAL THERAPY ADULT IP CONSULT  OCCUPATIONAL THERAPY ADULT IP CONSULT  CARE MANAGEMENT / SOCIAL WORK IP CONSULT  HOSPITALIST IP CONSULT  PHYSICAL THERAPY ADULT IP CONSULT  OCCUPATIONAL THERAPY ADULT IP CONSULT    Time Spent on this Encounter   IKatherine MD, personally saw the patient today and spent greater than 30 minutes discharging this patient.    Discharge Orders      Home care nursing referral      Reason for your hospital stay    S/p arthroplasty     When to call - Contact Surgeon Team    You may experience  "symptoms that require follow-up before your scheduled appointment. Refer to the \"[employer]\" for instructions on when to contact your Surgeon Team if you are concerned about pain control, blood clots, constipation, or if you are unable to urinate.     When to call - Reach out to Urgent Care    If you are not able to reach your Surgeon Team and you need immediate care, go to the Orthopedic Walk-in Clinic or Urgent Care at your Surgeon's office.  Do NOT go to the Emergency Room unless you have shortness of breath, chest pain, or other signs of a medical emergency.     When to call - Reasons to Call 911    Call 911 immediately if you experience sudden-onset chest pain, arm weakness/numbness, slurred speech, or shortness of breath     Discharge Instruction - Breathing exercises    Perform breathing exercises using your Incentive Spirometer 10 times per hour while awake for 2 weeks.     Symptoms - Fever Management    A low grade fever can be expected after surgery.  Use acetaminophen (TYLENOL) as needed for fever management.  Contact your Surgeon Team if you have a fever greater than 101.5 F, chills, and/or night sweats.     Symptoms - Constipation management    Constipation (hard, dry bowel movements) is expected after surgery due to the combination of being less active, the anesthetic, and the opioid pain medication.  You can do the following to help reduce constipation:  ~  FLUIDS:  Drink clear liquids (water or Gatorade), or juice (apple/prune).  ~  DIET:  Eat a fiber rich diet.    ~  ACTIVITY:  Get up and move around several times a day.  Increase your activity as you are able.  MEDICATIONS:  Reduce the risk of constipation by starting medications before you are constipated.  You can take Miralax   (1 packet as directed) and/or a stool softener (Senokot 1-2 tablets 1-2 times a day).  If you already have constipation and these medications are not working, you can get magnesium citrate and use as directed.  If you " continue to have constipation you can try an over the counter suppository or enema.  Call your Surgeon Team if it has been greater than 3 days since your last bowel movement.     Symptoms - Reduced Urine Output    Changes in the amount of fluids you drank before and after surgery may result in problems urinating.  It is important to stay well-hydrated after surgery and drink plenty of water. If it has been greater than 8 hours since you have urinated despite drinking plenty of water, call your Surgeon Team.     Medication instructions -  Anticoagulation - aspirin    Take the aspirin as prescribed for a total of four weeks after surgery.  This is given to help minimize your risk of blood clot.     Activity - Exercises to prevent blood clots    Unless otherwise directed by your Surgeon team, perform the following exercises at least three times per day for the first four weeks after surgery to prevent blood clots in your legs: 1) Point and flex your feet (Ankle Pumps), 2) Move your ankle around in big circles, 3) Wiggle your toes, 4) Walk, even for short distances, several times a day, will help decrease the risk of blood clots.     Comfort and Pain Management - Pain after Surgery    Pain after surgery is normal and expected.  You will have some amount of pain for several weeks after surgery.  Your pain will improve with time.  There are several things you can do to help reduce your pain including: rest, ice, elevation, and using pain medications as needed. Contact your Surgeon Team if you have pain that persists or worsens after surgery despite rest, ice, elevation, and taking your medication(s) as prescribed. Contact your Surgeon Team if you have new numbness, tingling, or weakness in your operative extremity.     Comfort and Pain Management - Swelling after Surgery    Swelling and/or bruising of the surgical extremity is common and may persist for several months after surgery. In addition to frequent icing and  "elevation, gentle compressive support with an ACE wrap or tubigrip may help with swelling. Apply compression regularly, removing at least twice daily to perform skin checks. Contact your Surgeon Team if your swelling increases and is NOT associated with an increase in your activity level, or if your swelling increases and is associated with redness and pain.     Comfort and Pain Management - LOWER Extremity Elevation    Swelling is expected for several months after surgery. This type of swelling is usually associated with gravity and activity, and can be improved with elevation.   The best way to do this is to get your \"toes above your nose\" by laying down and placing several pillows lengthwise under your calf and heel. When elevating your leg keep your knee completely straight. Perform this elevation as often as possible especially for the first two weeks after surgery.     Comfort and Pain Management - Cold therapy    Ice can be used to control swelling and discomfort after surgery. Place a thin towel over your operative site and apply the ice pack overtop. Leave ice pack in place for 20 minutes, then remove for 20 minutes. Repeat this 20 minutes on/20 minutes off routine as often as tolerated.     Medication Instructions - Acetaminophen (TYLENOL) Instructions    You were discharged with acetaminophen (TYLENOL) for pain management after surgery. Acetaminophen most effectively manages pain symptoms when it is taken on a schedule without missing doses (every four, six, or eight hours). Your Provider will prescribe a safe daily dose between 3000 - 4000 mg.  Do NOT exceed this daily dose. Most patients use acetaminophen for pain control for the first four weeks after surgery.  You can wean from this medication as your pain decreases.     Medication Instructions - NSAID Instructions    You were discharged with an anti-inflammatory medication for pain management to use in combination with acetaminophen (TYLENOL) and the " "narcotic pain medication.  Take this medication exactly as directed.  You should only take one anti-inflammatory at a time.  Some common anti-inflammatories include: ibuprofen (ADVIL, MOTRIN), naproxen (ALEVE, NAPROSYN), celecoxib (CELEBREX), meloxicam (MOBIC), ketorolac (TORADOL).  Take this medication with food and water.     Medication Instructions - Opioid Instructions (Greater than or equal to 65 years)    You were discharged with an opioid medication (hydromorphone, oxycodone, hydrocodone, or tramadol). This medication should only be taken for breakthrough pain that is not controlled with acetaminophen (TYLENOL). If you rate your pain less than 3 you do not need this medication.  Pain rating 0-3:  You do not need this medication  Pain rating 4-6:  Take 1/2 tablet every 4-6 hours as needed  Pain rating 7-10:  Take 1 tablet every 4-6 hours as needed  Do not exceed 4 tablets per day     Medication Instructions - Opioids - Tapering Instructions    In the first three days following surgery, your symptoms may warrant use of the narcotic pain medication every four to six hours as prescribed. This is normal. As your pain symptoms improve, focus your efforts on decreasing (tapering) use of narcotic medications. The most successful tapering strategy is to first, decrease the number of tablets you take every 4-6 hours to the minimum prescribed. Then, increase the amount of time between doses.  For example:  First, taper to   or 1 tablet every 4-6 hours.  Then, taper to   or 1 tablet every 6-8 hours.  Then, taper to   or 1 tablet every 8-10 hours.  Then, taper to   or 1 tablet every 10-12 hours.  Then, taper to   or 1 tablet at bedtime.  The bedtime dose can help with comfort during sleep and is typically the last dose to be discontinued after surgery.     Activity - Total Hip Arthroplasty    Refer to the [facility] \"Your Guide to Total Joint Replacement\" for recommendations on activities and Exercises.     Return " to Driving    Return to driving - Driving is NOT permitted until directed by your provider. Under no circumstance are you permitted to drive while using narcotic pain medications.     No internal rotation    No pivoting on your operative leg.     No adduction past midline    No crossing your operative leg.     Weight bearing as tolerated    Weight bearing as tolerated on your operative extremity.     Dressing / Wound Care - Wound    You have a clean dressing on your surgical wound. Dressing change instructions as follows:leave in place   Contact your Surgeon Team if you have increased redness, warmth around the surgical wound, and/or drainage from the surgical wound.     Dressing / Wound care - Shower with wound/dressing covered    Leave Aquacel in place     Dressing / Wound Care - NO Tub Bathing    Tub bathing, swimming, or any other activities that will cause your incision to be submerged in water should be avoided until allowed by your Surgeon.     Follow Up Care    Follow-up with Cee Ramirez PA-C with Dr Espinal in 10-14 days [phone]     When to contact your care team    Call your provider if you have any of the following: temperature greater than 101,  increased shortness of breath, increased drainage, redness or increasing calf pain/cramping.     Wound care and dressings    Instructions to care for your wound at home: Keep wound clean and dry.  Keep incision covered.  Able to shower over aquacel or tegaderm dressing.  Do not immerse.     Reason for your hospital stay    Fall, left femoral neck fracture, underwent hip arthroplasty     Follow-up and recommended labs and tests     Follow up with primary care provider, Waqar Stokes MD, within 7 days for hospital follow- up.  No follow up labs or test are needed.     When to contact your care team    Call your primary doctor if you have any of the following: worsening pain, fever, short of breath, chest pain, swelling, bleeding.     Crutches DME    DME  Documentation: Describe the reason for need to support medical necessity: Impaired gait status post hip surgery. I, the undersigned, certify that the above prescribed supplies are medically necessary for this patient and is both reasonable and necessary in reference to accepted standards of medical practice in the treatment of this patient's condition and is not prescribed as a convenience.     Clive TAVAREZ    DME Documentation: Describe the reason for need to support medical necessity: Impaired gait status post hip surgery. I, the undersigned, certify that the above prescribed supplies are medically necessary for this patient and is both reasonable and necessary in reference to accepted standards of medical practice in the treatment of this patient's condition and is not prescribed as a convenience.     [facility]    : DME Documentation: Describe the reason for need to support medical necessity: Impaired gait status post hip surgery. I, the undersigned, certify that the above prescribed supplies are medically necessary for this patient and is both reasonable and necessary in reference to accepted standards of medical practice in the treatment of this patient's condition and is not prescribed as a convenience.     Tyler    I, the undersigned, certify that the above prescribed supplies are medically necessary for this patient and is both reasonable and necessary in reference to accepted standards of medical and necessary in reference to accepted standards of medical practice in the treatment of this patient's condition and is not prescribed as a convenience.      Discharge Instruction - Regular Diet Adult    Return to your pre-surgery diet unless instructed otherwise     Diet    Follow this diet upon discharge: Orders Placed This Encounter      Advance Diet as Tolerated: Regular Diet Adult      Discharge Instruction - Regular Diet Adult     Discharge Medications   Current Discharge Medication List      START taking  these medications    Details   acetaminophen (TYLENOL) 325 MG tablet Take 2 tablets (650 mg) by mouth every 4 hours as needed for other (For optimal non-opioid multimodal pain management to improve pain control.)  Qty: 30 tablet, Refills: 0    Associated Diagnoses: Closed subcapital fracture of left femur, initial encounter (H)      aspirin (ASA) 325 MG EC tablet Take 1 tablet (325 mg) by mouth daily  Qty: 30 tablet, Refills: 0    Associated Diagnoses: Closed subcapital fracture of left femur, initial encounter (H)      oxyCODONE (ROXICODONE) 5 MG tablet Take 1-2 tablets (5-10 mg) by mouth every 4 hours as needed for moderate pain (4-6) or severe pain (7-10)  Qty: 25 tablet, Refills: 0    Associated Diagnoses: Closed subcapital fracture of left femur, initial encounter (H)      senna-docusate (SENOKOT-S/PERICOLACE) 8.6-50 MG tablet Take 1 tablet by mouth 2 times daily as needed for constipation  Qty: 20 tablet, Refills: 0    Associated Diagnoses: Closed subcapital fracture of left femur, initial encounter (H)         CONTINUE these medications which have NOT CHANGED    Details   FLUoxetine (PROZAC) 20 MG capsule Take 60 mg by mouth daily      lithium ER (LITHOBID) 300 MG CR tablet Take 900 mg by mouth every morning      loratadine (CLARITIN) 10 MG tablet Take 10 mg by mouth daily           Allergies   Allergies   Allergen Reactions     Aleve [Naproxen Sodium]      Asa [Aspirin]      sensitive to ASA causes increased nasal drainage     Haldol [Haloperidol]      Ibuprofen      sensitive to Ibuprofen. It causes increased nasal drainage.     Data   Most Recent 3 CBC's:  Recent Labs   Lab Test 02/23/23 0910 02/22/23 0721 02/20/23  1744   WBC  --   --  11.4*   HGB 11.3* 11.6* 12.5*   MCV  --   --  88   PLT  --   --  194      Most Recent 3 BMP's:  Recent Labs   Lab Test 02/23/23 0910 02/22/23 0721 02/20/23  1744   NA  --   --  136   POTASSIUM  --   --  3.7   CHLORIDE  --   --  102   CO2  --   --  26   BUN  --   --   16.0   CR 0.88 0.92 1.02   ANIONGAP  --   --  8   MITCHEL  --   --  9.0   * 125* 114*     Most Recent 2 LFT's:No lab results found.  Most Recent INR's and Anticoagulation Dosing History:  Anticoagulation Dose History    There is no flowsheet data to display.       Most Recent 3 Troponin's:No lab results found.  Most Recent Cholesterol Panel:No lab results found.  Most Recent 6 Bacteria Isolates From Any Culture (See EPIC Reports for Culture Details):No lab results found.  Most Recent TSH, T4 and A1c Labs:No lab results found.    Results for orders placed or performed during the hospital encounter of 02/20/23   XR Pelvis w Hip Left 1 View    Narrative    EXAM: XR PELVIS AND HIP LEFT 1 VIEW  LOCATION: [facility]  DATE/TIME: 2/20/2023 4:58 PM    INDICATION: fall, pain  COMPARISON: None.      Impression    IMPRESSION: Curvilinear band of sclerosis in the proximal left femoral neck worrisome for an acute impacted subcapital fracture. CT or MRI would be helpful in further evaluation. Moderate degenerative changes in both hips.   CT Hip Left w/o Contrast    Narrative    EXAM: CT HIP LEFT W/O CONTRAST  LOCATION: [facility]  DATE/TIME: 2/20/2023 6:11 PM    INDICATION: Injury, pain. Suspected femoral neck fracture.  COMPARISON: None.  TECHNIQUE: Noncontrast. Axial, sagittal and coronal thin-section reconstruction. Dose reduction techniques were used.     FINDINGS:   Acute to the fracture of the left femoral neck extending to the subcapital region. There is anterior displacement of the distal fragment by 1 cm and impaction posteriorly resulting in apex anterior angulation.    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 Mild degenerative arthritis of the SI joints.    No fluid collection or hematoma.      Impression    IMPRESSION:  1.  Acute displaced and angulated fracture of the left femoral neck near and involving the subcapital region.     XR Pelvis w Hip Port Left   1 View    Narrative    EXAM: XR PELVIS AND HIP PORTABLE LEFT 1 VIEW  LOCATION: [facility]  DATE/TIME: 2/21/2023 10:05 PM    INDICATION: Status post left hip surgery.  COMPARISON: None.      Impression    IMPRESSION:     Immediate postsurgical changes from left total hip arthroplasty, in satisfactory alignment, without evidence of immediate hardware complication. Adjacent postsurgical soft tissue swelling and gas. Overlying surgical skin staples.    No evidence of acute fracture or dislocation.     Mild to moderate degenerative changes of the right hip.   XR Pelvis w Hip Left 1 View    Narrative    EXAM: XR PELVIS AND HIP LEFT 1 VIEW  LOCATION: [facility]  DATE/TIME: 2/22/2023 5:32 PM    INDICATION: eval hip components, fall after surgery  COMPARISON: 02/21/2023      Impression    IMPRESSION: Left BRIJESH. Components are well seated. No fractures are evident. Postoperative air is present. Skin staples are in place.

## 2023-02-23 NOTE — CONSULTS
Care Management Discharge Note    Discharge Date: 02/23/2023       Discharge Disposition:  Home    Discharge Services:  Home Care: Referrals sent to central Referral HUB    Discharge DME: walker     Discharge Transportation:  Spouse Amandeep Florentino present in room and transporting patient home.    Private pay costs discussed: n/a    PAS Confirmation Code:  n/a  Patient/family educated on Medicare website which has current facility and service quality ratings:  yes    Education Provided on the Discharge Plan:  yes  Persons Notified of Discharge Plans: Patient, spouse Amandeep Florentino, Charge  Patient/Family in Agreement with the Plan:  yes    Handoff Referral Completed: No    Additional Information:   Writer informed later this afternoon that patient is medically cleared to discharge home today. Home Care has been ordered for PT/RN. Writer sent the referral to the [facility]. At the time of discharge, a Home Care Agency had not been secured. (Patient and his spouse were very anxious to discharge home since they found out their 6 year old son had a seizure). Bedside RN explained to patient he would be getting a call from [facility] Care Coordinator once a Home Care Agency was found. Patient was then discharged. Care Coordinator to follow-up tomorrow.  No further discharge needs at this time.          Lissette Garner RN  Care Coordinator  [phone]

## 2023-02-23 NOTE — PLAN OF CARE
Pt A&Ox4; VSS on RA. Pain managed with current regimen. Ambulated to BR; voiding well. A of 1 GB/W. Uses urinal as well. Aquacel dressing on L hip marked; no changes. SL. Requested PRN miralax; no BM this shift. Possible discharge to home today.

## 2023-02-23 NOTE — PLAN OF CARE
Patient A/Ox4, VSS, RA. Incision CDI, CMS intact. Ambulated well in the room, to the bathroom w/ SBA, gaitbelt and walker. Good intake and output     Patient discharge to home w/ wife at 1700. Both were educated regarding incision care, pain and medication management, continues use of IS and follow up.     Patient does not have Home Care set up yet when discharge. And needed to go home for the kids. One of his kid just got out of hospital too.      Care coordinator and I talked that CC will call the patient when they find an agency for RN and PT home care. Patient agreed with the set up.

## 2023-02-23 NOTE — PROGRESS NOTES
02/23/23 1100   Appointment Info   Signing Clinician's Name / Credentials (OT) Alondra Purdy,OTR/L   Living Environment   People in Home child(génseis), dependent;spouse   Current Living Arrangements house   Home Accessibility stairs to enter home;stairs within home   Number of Stairs, Main Entrance 2   Stair Railings, Main Entrance none   Number of Stairs, Within Home, Primary ten   Stair Railings, Within Home, Primary railing on right side (ascending)   Transportation Anticipated family or friend will provide   Living Environment Comments Pt lives in a house with his spouse and children. Stairs within and to enter. Pt reports his spouse will pick him up upon discharge. Pt has assist if needed. Pt. reports he uses a free-standing toilet, walk-in shower w/built-in bench.   Self-Care   Usual Activity Tolerance good   Current Activity Tolerance good   Regular Exercise No   Equipment Currently Used at Home none   Number of times patient has fallen within last six months 3   Activity/Exercise/Self-Care Comment Pt reports being IND at baseline with all ADLs. Pt ambulates w/o an AD at baseline and doesn't have a FWW. Pt works a desk job.   General Information   Onset of Illness/Injury or Date of Surgery 02/20/23   Referring Physician Pedro Espinal MD   Additional Occupational Profile Info/Pertinent History of Current Problem Pt. is POD#2 L [employer]   Performance Patterns (Routines, Roles, Habits) active,indep. at baseline   Existing Precautions/Restrictions fall;no hip IR;no hip ADD past midline   Left Lower Extremity (Weight-bearing Status) weight-bearing as tolerated (WBAT)   General Observations and Info Pt. lying in bed, agreeable to SOLA;dayana rated 3-4/10   Cognitive Status Examination   Orientation Status orientation to person, place and time   Affect/Mental Status (Cognitive) WNL;WFL   Follows Commands WNL;WFL   Cognitive Status Comments intact per observation/conversation   Visual Perception   Impact of Vision  Impairment on Function (Vision) no vision problems noted/reported   Sensory   Sensory Comments no numbness/tingling repored   Pain Assessment   Patient Currently in Pain   (4/10 pain supine/rest)   Posture   Posture forward head position;protracted shoulders   Range of Motion Comprehensive   Comment, General Range of Motion BUE WNL/WFL   Strength Comprehensive (MMT)   Comment, General Manual Muscle Testing (MMT) Assessment BUE WNL/WFL   Coordination   Upper Extremity Coordination No deficits were identified   Bed Mobility   Comment (Bed Mobility) SBA   Transfers   Transfer Comments SBA   Balance   Balance Comments good balance overall using AD, no LOB   Lower Body Dressing Assessment/Training   [place] Level (Lower Body Dressing) set up;supervision  (SBA)   Grooming Assessment/Training   [place] Level (Grooming) set up;supervision  (SBA)   Toileting   [place] Level (Toileting) supervision  (SBA)   Clinical Impression   Criteria for Skilled Therapeutic Interventions Met (OT) Yes, treatment indicated   OT Diagnosis Decline in ADL performance   OT Problem List-Impairments impacting ADL problems related to;balance;flexibility;mobility;range of motion (ROM);pain;strength  (decreased LE strength/ROM)   Assessment of Occupational Performance 3-5 Performance Deficits   Identified Performance Deficits dressing, toileting, bathing, mobility, IADL's   Planned Therapy Interventions (OT) ADL retraining   Clinical Decision Making Complexity (OT) low complexity   Anticipated Equipment Needs Upon Discharge (OT) shower chair;raised toilet seat  (RTS w/armrests)   Risk & Benefits of therapy have been explained evaluation/treatment results reviewed;care plan/treatment goals reviewed;risks/benefits reviewed;current/potential barriers reviewed;participants voiced agreement with care plan;participants included;patient   OT Total Evaluation Time   OT Eval, Low Complexity Minutes (41964) 8   OT Goals   Therapy Frequency (OT)  One time eval and treatment   OT Predicted Duration/Target Date for Goal Attainment 02/24/23   OT Goals Hygiene/Grooming;Lower Body Dressing;Toilet Transfer/Toileting;Transfers   OT: Hygiene/Grooming supervision/stand-by assist;within precautions;while standing;Goal Met   OT: Lower Body Dressing Supervision/stand-by assist;using adaptive equipment;within precautions;including set-up/clothing retrieval;Goal Met   OT: Transfer Goal Met  (OT:Pt. will verbalize &/or demo.safe shower transfer technique usingDME, adhering to precuations. GOAL MET--verbalized understanding of safe shower transfer tech.)   OT: Toilet Transfer/Toileting Supervision/stand-by assist;toilet transfer;using adaptive equipment;within precautions;Goal Met   Interventions   Interventions Quick Adds Self-Care/Home Management   Self-Care/Home Management   Self-Care/Home Mgmt/ADL, Compensatory, Meal Prep Minutes (00340) 24   Symptoms Noted During/After Treatment (Meal Preparation/Planning Training) fatigue;increased pain   Treatment Detail/Skilled Intervention OT:Pt. lying in bed, supine, agreeable to OT;reviewed/ed. in hip precautions--verbalized udnerstanding;Pt. able to come supine-sit w/ SBA;completed sit-stand w/ SBA;ambulated in room X 30 feet (total) w/ SBA, moving well;pt. completed toilet transfer w/ commode overlay/B armrest w/ SBA--rec. RTS w/ armrests and issued DME resource;Ed. pt. in safe shower transfer tech. w/ therapist demo., and made rec. for shower chair([facility] reour)--pt. verbalized understanding;Pt. sat in chair for instruction in comp. techs./AE for LE dressing while adhering to precs.--pt. able to kelly underwear w/ set-up + increased time utilizing comp. tech.;doff/kelly B socks w/ increased time, declined AE;donned R shoe w/ set-up, L shoe w/ set-up + SBA + LHSH(has a SH at home)'pt. declined elastic shoelaces;ed. in home WW/environmental safety--verbalized understanding;Overall, pt. doing well, all goals met;no further  skilled OT indicated.   OT Discharge Planning   OT Plan DC   OT Discharge Recommendation (DC Rec) home with assist   OT Rationale for DC Rec Overall, pt. moving wel, SBA for bed mobility, transfers, room mobility, standing ADL's, LE dressing tasks w/ set-up;pt.will have assist from spouse/family at home;rec. RTS w/armrests + shower chair--issued DME resource. No further skilled OT indicated. GOALS MET.   Total Session Time   Timed Code Treatment Minutes 24   Total Session Time (sum of timed and untimed services) 32

## 2023-02-23 NOTE — PROGRESS NOTES
Orthopedic Surgery  Garfield Kuhn  02/23/2023     Admit Date:  2/20/2023  POD: 2 Days Post-Op   Procedure(s):  LEFT TOTAL HIP ARTHROPLASTY    Alert and oriented.   Patient resting comfortably in chair   Pain controlled.  Tolerating oral intake.    Denies nausea or vomiting  Denies chest pain or shortness of breath    Temp:  [97.9  F (36.6  C)-99  F (37.2  C)] 97.9  F (36.6  C)  Pulse:  [72-74] 72  Resp:  [16-18] 18  BP: (120-138)/(83-84) 120/83  SpO2:  [95 %-98 %] 98 %    Dressing is clean, dry, and intact.   Minimal erythema of the surrounding skin.   Bilateral calves are soft, non-tender.  Left lower extremity is NVI.  Sensation intact bilateral lower extremities  Patient able to resist dorsi and plantar flexion bilaterally  +Dp pulse    Labs:  Recent Labs   Lab Test 02/22/23  0721 02/20/23  1744   WBC  --  11.4*   HGB 11.6* 12.5*   PLT  --  194       1. Plan:    Xrays confirm components well fixated and no fractures seen.     Continue ASA for DVT prophylaxis.     Mobilize with PT/OT    WBAT Left LE with gait aide.     Continue current pain regiment.   Dressings: Keep intact.  Change if >60% saturated or peeling off.    Follow-up: 2 weeks post-op with Dr Espinal team    2. Disposition   Anticipate d/c to home either later today or tomorrow pending family assist at home.     Jamilah Boyce PA-C

## 2023-02-24 NOTE — PLAN OF CARE
Physical Therapy Discharge Summary    Reason for therapy discharge:    All goals and outcomes met, no further needs identified.    Progress towards therapy goal(s). See goals on Care Plan in [facility] electronic health record for goal details.  Goals met    Therapy recommendation(s):    No further therapy is recommended.

## 2023-04-23 NOTE — ANESTHESIA CARE TRANSFER NOTE
Patient: Garfield Kuhn    Procedure: Procedure(s):  LEFT TOTAL HIP ARTHROPLASTY       Diagnosis: Closed displaced fracture of left femoral neck (H) [S72.002A]  Diagnosis Additional Information: No value filed.    Anesthesia Type:   General     Note:    Oropharynx: oropharynx clear of all foreign objects and spontaneously breathing  Level of Consciousness: awake  Oxygen Supplementation: face mask  Level of Supplemental Oxygen (L/min / FiO2): 6  Independent Airway: airway patency satisfactory and stable  Dentition: dentition unchanged  Vital Signs Stable: post-procedure vital signs reviewed and stable  Report to RN Given: handoff report given  Patient transferred to: PACU  Comments: Neuromuscular blockade reversed with sugammadex, spontaneous respirations, adequate tidal volumes, followed commands to voice, oropharynx suctioned with soft flexible catheter, extubated atraumatically, extubated with suction, airway patent after extubation.  Oxygen via facemask at 6 liters per minute to PACU. Oxygen tubing connected to wall O2 in PACU, SpO2, NiBP, and EKG monitors and alarms on and functioning, report on patient's clinical status given to PACU RN, RN questions answered.     Handoff Report: Identifed the Patient, Identified the Reponsible Provider, Reviewed the pertinent medical history, Discussed the surgical course, Reviewed Intra-OP anesthesia mangement and issues during anesthesia, Set expectations for post-procedure period and Allowed opportunity for questions and acknowledgement of understanding      Vitals:  Vitals Value Taken Time   /80 02/21/23 2123   Temp 37.5  C (99.5  F) 02/21/23 2123   Pulse 78 02/21/23 2127   Resp 19 02/21/23 2127   SpO2 95 % 02/21/23 2127   Vitals shown include unvalidated device data.    Electronically Signed By: SATNAM Barfield CRNA  February 21, 2023  9:28 PM  
Moderate: Comprehensive teaching

## 2023-11-14 DIAGNOSIS — Z47.1 AFTERCARE FOLLOWING JOINT REPLACEMENT: Primary | ICD-10-CM

## 2023-11-14 DIAGNOSIS — M25.559 PAIN IN JOINT, PELVIC REGION AND THIGH: ICD-10-CM

## 2023-11-15 ENCOUNTER — LAB (OUTPATIENT)
Dept: LAB | Facility: CLINIC | Age: 43
End: 2023-11-15
Payer: COMMERCIAL

## 2023-11-15 DIAGNOSIS — M25.559 PAIN IN JOINT, PELVIC REGION AND THIGH: ICD-10-CM

## 2023-11-15 DIAGNOSIS — Z47.1 AFTERCARE FOLLOWING JOINT REPLACEMENT: ICD-10-CM

## 2023-11-15 LAB
CRP SERPL-MCNC: <3 MG/L
ERYTHROCYTE [SEDIMENTATION RATE] IN BLOOD BY WESTERGREN METHOD: 8 MM/HR (ref 0–15)

## 2023-11-15 PROCEDURE — 85652 RBC SED RATE AUTOMATED: CPT

## 2023-11-15 PROCEDURE — 86140 C-REACTIVE PROTEIN: CPT

## 2023-11-15 PROCEDURE — 36415 COLL VENOUS BLD VENIPUNCTURE: CPT

## 2024-01-13 ENCOUNTER — HEALTH MAINTENANCE LETTER (OUTPATIENT)
Age: 44
End: 2024-01-13

## 2025-01-26 ENCOUNTER — HEALTH MAINTENANCE LETTER (OUTPATIENT)
Age: 45
End: 2025-01-26

## (undated) DEVICE — DRAPE IOBAN INCISE 23X17" 6650EZ

## (undated) DEVICE — Device

## (undated) DEVICE — SUCTION IRR SYSTEM W/O TIP INTERPULSE HANDPIECE 0210-100-000

## (undated) DEVICE — SU VICRYL 2-0 CT-1 27" J339H

## (undated) DEVICE — NDL SPINAL 18GA 3.5" 405184

## (undated) DEVICE — GLOVE BIOGEL PI MICRO INDICATOR UNDERGLOVE SZ 8.0 48980

## (undated) DEVICE — DRAPE STOCKINETTE IMPERVIOUS 12" 1587

## (undated) DEVICE — SYR 30ML LL W/O NDL 302832

## (undated) DEVICE — IMM PILLOW ABDUCT HIP MED 31143061

## (undated) DEVICE — STPL SKIN 35W 6.9MM  PXW35

## (undated) DEVICE — NDL 18GA 1.5" 305196

## (undated) DEVICE — ESU GROUND PAD UNIVERSAL W/O CORD

## (undated) DEVICE — ESU ELEC BLADE NN-STCK PLUMEPEN PRO 360D 10FT PLPRO4020

## (undated) DEVICE — MANIFOLD NEPTUNE 4 PORT 700-20

## (undated) DEVICE — LINEN TOWEL PACK X5 5464

## (undated) DEVICE — SOLUTION WOUND CLEANSING 3/4OZ 10% PVP EA-L3011FB-50

## (undated) DEVICE — SPONGE LAP 18X18" X8435

## (undated) DEVICE — PAD FOAM MCGUIRE KIT 0814-0150

## (undated) DEVICE — GLOVE BIOGEL PI ULTRATOUCH SZ 8.0 41180

## (undated) DEVICE — CLOSURE SYS SKIN PREMIERPRO EXOFIN FUSION 4X22CM STRL 3472

## (undated) DEVICE — DRSG XEROFORM 1X8"

## (undated) DEVICE — PACK TOTAL HIP W/POUCH SOP15HPFSM

## (undated) DEVICE — SU FIBERWIRE 2 38"  AR-7200

## (undated) DEVICE — SU VICRYL 0 CT-1 27" J340H

## (undated) DEVICE — DRSG AQUACEL AG HYDROFIBER  3.5X10" 422605

## (undated) DEVICE — SOL WATER IRRIG 1000ML BOTTLE 2F7114

## (undated) DEVICE — SU STRATAFIX PDS PLUS 0 CT 45CM SXPP1A406

## (undated) DEVICE — SOL NACL 0.9% IRRIG 3000ML BAG 2B7477

## (undated) DEVICE — BLADE SAW SAGITTAL STRK 18X90X1.27MM HD SYS 6 6118-127-090

## (undated) DEVICE — GOWN XXLG REINFORCED 9071EL

## (undated) DEVICE — SUCTION TIP YANKAUER STR K87

## (undated) DEVICE — SOL NACL 0.9% IRRIG 1000ML BOTTLE 2F7124

## (undated) DEVICE — SU ETHIBOND 0 CT-1 CR 8X18" CX21D

## (undated) RX ORDER — DEXAMETHASONE SODIUM PHOSPHATE 4 MG/ML
INJECTION, SOLUTION INTRA-ARTICULAR; INTRALESIONAL; INTRAMUSCULAR; INTRAVENOUS; SOFT TISSUE
Status: DISPENSED
Start: 2023-02-21

## (undated) RX ORDER — EPINEPHRINE 1 MG/ML
INJECTION, SOLUTION INTRAMUSCULAR; SUBCUTANEOUS
Status: DISPENSED
Start: 2023-02-21

## (undated) RX ORDER — FENTANYL CITRATE 0.05 MG/ML
INJECTION, SOLUTION INTRAMUSCULAR; INTRAVENOUS
Status: DISPENSED
Start: 2023-02-21

## (undated) RX ORDER — FENTANYL CITRATE 50 UG/ML
INJECTION, SOLUTION INTRAMUSCULAR; INTRAVENOUS
Status: DISPENSED
Start: 2023-02-21

## (undated) RX ORDER — ONDANSETRON 2 MG/ML
INJECTION INTRAMUSCULAR; INTRAVENOUS
Status: DISPENSED
Start: 2023-02-21

## (undated) RX ORDER — VANCOMYCIN HYDROCHLORIDE 1 G/20ML
INJECTION, POWDER, LYOPHILIZED, FOR SOLUTION INTRAVENOUS
Status: DISPENSED
Start: 2023-02-21

## (undated) RX ORDER — BUPIVACAINE HYDROCHLORIDE 2.5 MG/ML
INJECTION, SOLUTION EPIDURAL; INFILTRATION; INTRACAUDAL
Status: DISPENSED
Start: 2023-02-21

## (undated) RX ORDER — HYDROMORPHONE HYDROCHLORIDE 1 MG/ML
INJECTION, SOLUTION INTRAMUSCULAR; INTRAVENOUS; SUBCUTANEOUS
Status: DISPENSED
Start: 2023-02-21

## (undated) RX ORDER — PROPOFOL 10 MG/ML
INJECTION, EMULSION INTRAVENOUS
Status: DISPENSED
Start: 2023-02-21

## (undated) RX ORDER — CEFAZOLIN SODIUM/WATER 3 G/30 ML
SYRINGE (ML) INTRAVENOUS
Status: DISPENSED
Start: 2023-02-21